# Patient Record
Sex: MALE | Race: OTHER | Employment: FULL TIME | URBAN - METROPOLITAN AREA
[De-identification: names, ages, dates, MRNs, and addresses within clinical notes are randomized per-mention and may not be internally consistent; named-entity substitution may affect disease eponyms.]

---

## 2021-11-18 ENCOUNTER — HOSPITAL ENCOUNTER (INPATIENT)
Age: 26
LOS: 5 days | Discharge: HOME | DRG: 885 | End: 2021-11-24
Attending: EMERGENCY MEDICINE | Admitting: PSYCHIATRY & NEUROLOGY

## 2021-11-18 DIAGNOSIS — F32.3 CURRENT SEVERE EPISODE OF MAJOR DEPRESSIVE DISORDER WITH PSYCHOTIC FEATURES WITHOUT PRIOR EPISODE (HCC): ICD-10-CM

## 2021-11-18 DIAGNOSIS — T14.91XA SUICIDE ATTEMPT (HCC): Primary | ICD-10-CM

## 2021-11-18 DIAGNOSIS — F14.10 COCAINE USE DISORDER (HCC): ICD-10-CM

## 2021-11-18 DIAGNOSIS — T54.92XA INGESTION OF CORROSIVE CHEMICAL, INTENTIONAL SELF-HARM, INITIAL ENCOUNTER (HCC): ICD-10-CM

## 2021-11-18 LAB
ALBUMIN SERPL-MCNC: 4 G/DL (ref 3.5–5)
ALBUMIN/GLOB SERPL: 1.1 {RATIO} (ref 1.1–2.2)
ALP SERPL-CCNC: 88 U/L (ref 45–117)
ALT SERPL-CCNC: 22 U/L (ref 12–78)
AMPHET UR QL SCN: NEGATIVE
ANION GAP SERPL CALC-SCNC: 10 MMOL/L (ref 5–15)
APAP SERPL-MCNC: <2 UG/ML (ref 10–30)
APPEARANCE UR: ABNORMAL
APTT PPP: 23.6 SEC (ref 22.1–31)
AST SERPL-CCNC: 16 U/L (ref 15–37)
BARBITURATES UR QL SCN: NEGATIVE
BASOPHILS # BLD: 0.1 K/UL (ref 0–0.1)
BASOPHILS NFR BLD: 1 % (ref 0–1)
BENZODIAZ UR QL: NEGATIVE
BILIRUB SERPL-MCNC: 0.5 MG/DL (ref 0.2–1)
BILIRUB UR QL CFM: NEGATIVE
BUN SERPL-MCNC: 9 MG/DL (ref 6–20)
BUN/CREAT SERPL: 11 (ref 12–20)
CALCIUM SERPL-MCNC: 9.2 MG/DL (ref 8.5–10.1)
CANNABINOIDS UR QL SCN: POSITIVE
CHLORIDE SERPL-SCNC: 104 MMOL/L (ref 97–108)
CO2 SERPL-SCNC: 26 MMOL/L (ref 21–32)
COCAINE UR QL SCN: POSITIVE
COLOR UR: ABNORMAL
COMMENT, HOLDF: NORMAL
CREAT SERPL-MCNC: 0.84 MG/DL (ref 0.7–1.3)
DIFFERENTIAL METHOD BLD: ABNORMAL
DRUG SCRN COMMENT,DRGCM: ABNORMAL
EOSINOPHIL # BLD: 0 K/UL (ref 0–0.4)
EOSINOPHIL NFR BLD: 0 % (ref 0–7)
ERYTHROCYTE [DISTWIDTH] IN BLOOD BY AUTOMATED COUNT: 13.5 % (ref 11.5–14.5)
ETHANOL SERPL-MCNC: <10 MG/DL
FLUAV RNA SPEC QL NAA+PROBE: NOT DETECTED
FLUBV RNA SPEC QL NAA+PROBE: NOT DETECTED
GLOBULIN SER CALC-MCNC: 3.8 G/DL (ref 2–4)
GLUCOSE SERPL-MCNC: 104 MG/DL (ref 65–100)
GLUCOSE UR STRIP.AUTO-MCNC: NEGATIVE MG/DL
HCT VFR BLD AUTO: 48.3 % (ref 36.6–50.3)
HGB BLD-MCNC: 16.6 G/DL (ref 12.1–17)
HGB UR QL STRIP: NEGATIVE
IMM GRANULOCYTES # BLD AUTO: 0.1 K/UL (ref 0–0.04)
IMM GRANULOCYTES NFR BLD AUTO: 1 % (ref 0–0.5)
INR PPP: 1 (ref 0.9–1.1)
KETONES UR QL STRIP.AUTO: 15 MG/DL
LEUKOCYTE ESTERASE UR QL STRIP.AUTO: NEGATIVE
LIPASE SERPL-CCNC: 79 U/L (ref 73–393)
LYMPHOCYTES # BLD: 1.1 K/UL (ref 0.8–3.5)
LYMPHOCYTES NFR BLD: 11 % (ref 12–49)
MAGNESIUM SERPL-MCNC: 2.2 MG/DL (ref 1.6–2.4)
MCH RBC QN AUTO: 30.5 PG (ref 26–34)
MCHC RBC AUTO-ENTMCNC: 34.4 G/DL (ref 30–36.5)
MCV RBC AUTO: 88.8 FL (ref 80–99)
METHADONE UR QL: NEGATIVE
MONOCYTES # BLD: 0.5 K/UL (ref 0–1)
MONOCYTES NFR BLD: 5 % (ref 5–13)
NEUTS SEG # BLD: 8.6 K/UL (ref 1.8–8)
NEUTS SEG NFR BLD: 82 % (ref 32–75)
NITRITE UR QL STRIP.AUTO: NEGATIVE
NRBC # BLD: 0 K/UL (ref 0–0.01)
NRBC BLD-RTO: 0 PER 100 WBC
OPIATES UR QL: NEGATIVE
PCP UR QL: NEGATIVE
PH UR STRIP: 7 [PH] (ref 5–8)
PLATELET # BLD AUTO: 322 K/UL (ref 150–400)
PMV BLD AUTO: 11.6 FL (ref 8.9–12.9)
POTASSIUM SERPL-SCNC: 3.1 MMOL/L (ref 3.5–5.1)
PROT SERPL-MCNC: 7.8 G/DL (ref 6.4–8.2)
PROT UR STRIP-MCNC: NEGATIVE MG/DL
PROTHROMBIN TIME: 10.4 SEC (ref 9–11.1)
RBC # BLD AUTO: 5.44 M/UL (ref 4.1–5.7)
SALICYLATES SERPL-MCNC: 2.4 MG/DL (ref 2.8–20)
SAMPLES BEING HELD,HOLD: NORMAL
SARS-COV-2, COV2: NOT DETECTED
SODIUM SERPL-SCNC: 140 MMOL/L (ref 136–145)
SP GR UR REFRACTOMETRY: 1.02 (ref 1–1.03)
THERAPEUTIC RANGE,PTTT: NORMAL SECS (ref 58–77)
UROBILINOGEN UR QL STRIP.AUTO: 1 EU/DL (ref 0.2–1)
WBC # BLD AUTO: 10.4 K/UL (ref 4.1–11.1)

## 2021-11-18 PROCEDURE — 80053 COMPREHEN METABOLIC PANEL: CPT

## 2021-11-18 PROCEDURE — 80143 DRUG ASSAY ACETAMINOPHEN: CPT

## 2021-11-18 PROCEDURE — 36415 COLL VENOUS BLD VENIPUNCTURE: CPT

## 2021-11-18 PROCEDURE — 83690 ASSAY OF LIPASE: CPT

## 2021-11-18 PROCEDURE — 85610 PROTHROMBIN TIME: CPT

## 2021-11-18 PROCEDURE — 99285 EMERGENCY DEPT VISIT HI MDM: CPT

## 2021-11-18 PROCEDURE — 74011250636 HC RX REV CODE- 250/636: Performed by: EMERGENCY MEDICINE

## 2021-11-18 PROCEDURE — 93005 ELECTROCARDIOGRAM TRACING: CPT

## 2021-11-18 PROCEDURE — 83735 ASSAY OF MAGNESIUM: CPT

## 2021-11-18 PROCEDURE — 90791 PSYCH DIAGNOSTIC EVALUATION: CPT

## 2021-11-18 PROCEDURE — 80307 DRUG TEST PRSMV CHEM ANLYZR: CPT

## 2021-11-18 PROCEDURE — 80179 DRUG ASSAY SALICYLATE: CPT

## 2021-11-18 PROCEDURE — 85025 COMPLETE CBC W/AUTO DIFF WBC: CPT

## 2021-11-18 PROCEDURE — 82077 ASSAY SPEC XCP UR&BREATH IA: CPT

## 2021-11-18 PROCEDURE — 81003 URINALYSIS AUTO W/O SCOPE: CPT

## 2021-11-18 PROCEDURE — 85730 THROMBOPLASTIN TIME PARTIAL: CPT

## 2021-11-18 PROCEDURE — 87636 SARSCOV2 & INF A&B AMP PRB: CPT

## 2021-11-18 RX ADMIN — SODIUM CHLORIDE 1000 ML: 9 INJECTION, SOLUTION INTRAVENOUS at 22:45

## 2021-11-19 PROBLEM — F43.21 ADJUSTMENT DISORDER WITH DEPRESSED MOOD: Status: RESOLVED | Noted: 2021-11-19 | Resolved: 2021-11-19

## 2021-11-19 PROBLEM — F32.3 CURRENT SEVERE EPISODE OF MAJOR DEPRESSIVE DISORDER WITH PSYCHOTIC FEATURES WITHOUT PRIOR EPISODE (HCC): Status: ACTIVE | Noted: 2021-11-19

## 2021-11-19 PROBLEM — F14.10 COCAINE USE DISORDER (HCC): Status: ACTIVE | Noted: 2021-11-19

## 2021-11-19 PROBLEM — F43.21 ADJUSTMENT DISORDER WITH DEPRESSED MOOD: Status: ACTIVE | Noted: 2021-11-19

## 2021-11-19 LAB
ATRIAL RATE: 132 BPM
CALCULATED P AXIS, ECG09: 63 DEGREES
CALCULATED R AXIS, ECG10: 107 DEGREES
CALCULATED T AXIS, ECG11: 1 DEGREES
DIAGNOSIS, 93000: NORMAL
P-R INTERVAL, ECG05: 142 MS
Q-T INTERVAL, ECG07: 294 MS
QRS DURATION, ECG06: 82 MS
QTC CALCULATION (BEZET), ECG08: 435 MS
VENTRICULAR RATE, ECG03: 132 BPM

## 2021-11-19 PROCEDURE — 74011250637 HC RX REV CODE- 250/637: Performed by: EMERGENCY MEDICINE

## 2021-11-19 PROCEDURE — 99223 1ST HOSP IP/OBS HIGH 75: CPT | Performed by: PSYCHIATRY & NEUROLOGY

## 2021-11-19 PROCEDURE — 65220000003 HC RM SEMIPRIVATE PSYCH

## 2021-11-19 RX ORDER — POTASSIUM CHLORIDE 750 MG/1
40 TABLET, FILM COATED, EXTENDED RELEASE ORAL
Status: COMPLETED | OUTPATIENT
Start: 2021-11-19 | End: 2021-11-19

## 2021-11-19 RX ORDER — BENZTROPINE MESYLATE 1 MG/1
1 TABLET ORAL
Status: DISCONTINUED | OUTPATIENT
Start: 2021-11-19 | End: 2021-11-24 | Stop reason: HOSPADM

## 2021-11-19 RX ORDER — OLANZAPINE 5 MG/1
5 TABLET ORAL
Status: DISCONTINUED | OUTPATIENT
Start: 2021-11-19 | End: 2021-11-24 | Stop reason: HOSPADM

## 2021-11-19 RX ORDER — HYDROXYZINE 25 MG/1
50 TABLET, FILM COATED ORAL
Status: DISCONTINUED | OUTPATIENT
Start: 2021-11-19 | End: 2021-11-24 | Stop reason: HOSPADM

## 2021-11-19 RX ORDER — LORAZEPAM 2 MG/ML
1 INJECTION INTRAMUSCULAR
Status: DISCONTINUED | OUTPATIENT
Start: 2021-11-19 | End: 2021-11-24 | Stop reason: HOSPADM

## 2021-11-19 RX ORDER — ADHESIVE BANDAGE
30 BANDAGE TOPICAL DAILY PRN
Status: DISCONTINUED | OUTPATIENT
Start: 2021-11-19 | End: 2021-11-24 | Stop reason: HOSPADM

## 2021-11-19 RX ORDER — TRAZODONE HYDROCHLORIDE 50 MG/1
50 TABLET ORAL
Status: DISCONTINUED | OUTPATIENT
Start: 2021-11-19 | End: 2021-11-24 | Stop reason: HOSPADM

## 2021-11-19 RX ORDER — DIPHENHYDRAMINE HYDROCHLORIDE 50 MG/ML
50 INJECTION, SOLUTION INTRAMUSCULAR; INTRAVENOUS
Status: DISCONTINUED | OUTPATIENT
Start: 2021-11-19 | End: 2021-11-24 | Stop reason: HOSPADM

## 2021-11-19 RX ORDER — HALOPERIDOL 5 MG/ML
5 INJECTION INTRAMUSCULAR
Status: DISCONTINUED | OUTPATIENT
Start: 2021-11-19 | End: 2021-11-24 | Stop reason: HOSPADM

## 2021-11-19 RX ORDER — FLUOXETINE HYDROCHLORIDE 20 MG/1
20 CAPSULE ORAL DAILY
Status: DISCONTINUED | OUTPATIENT
Start: 2021-11-19 | End: 2021-11-21

## 2021-11-19 RX ORDER — ACETAMINOPHEN 325 MG/1
650 TABLET ORAL
Status: DISCONTINUED | OUTPATIENT
Start: 2021-11-19 | End: 2021-11-24 | Stop reason: HOSPADM

## 2021-11-19 RX ADMIN — POTASSIUM CHLORIDE 40 MEQ: 750 TABLET, EXTENDED RELEASE ORAL at 03:07

## 2021-11-19 NOTE — ED TRIAGE NOTES
Arrives with friends, pt reports he drank harris killer 9pm tonight in order to kill himself. Pt smoked marijuana and crack cocaine and suffers with depression.

## 2021-11-19 NOTE — H&P
INITIAL PSYCHIATRIC EVALUATION            IDENTIFICATION:    Patient Name  Lina Lui   Date of Birth 1995   [facility] [id number]   Medical Record Number  [id number]      Age  32 y.o. PCP None   Admit date:  11/18/2021    Room Number  324/02  @ [facility]   Date of Service  11/19/2021            HISTORY         REASON FOR HOSPITALIZATION:  CC: \"suicide attempt\". Pt admitted under a voluntary basis for suicidal ideations proving to be an imminent danger to self and an inability to care for self. HISTORY OF PRESENT ILLNESS:    The patient, Lina Lui, is a 32 y.o. / male with a past psychiatric history significant for MDD and cocaine and cannabis use disorder, who presents at this time with complaints of (and/or evidence of) the following emotional symptoms: depression, suicidal thoughts/threats and suicide attempt via drinking poison. The above symptoms have been present for 2+ weeks. These symptoms are of moderate to high severity. These symptoms are constant in nature. The patient's condition has been precipitated by psychosocial stressors. Patient's condition made worse by continued illicit drug use. UDS: +cocaine, THC; BAL=0. Per admission documentation, the patient has been having several personal stressors, including a recent separation from his wife and children. 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 The patient denies any prior suicide attempts and states that he has never had any psychiatric problems prior to this. He endorsed AH of a voice telling him to harm himself after having smoked crack cocaine for the second time. The patient is a fair historian and [place]e speaking. The patient corroborates the above narrative. The patient contracts for safety on the unit and gives consent for the team to contact collateral. The patient is amenable to initiating treatment while on the unit. ALLERGIES: No Known Allergies   MEDICATIONS PRIOR TO ADMISSION:   No medications prior to admission. PAST MEDICAL HISTORY:   History reviewed. No pertinent past medical history. History reviewed. No pertinent surgical history. SOCIAL HISTORY:  The patient is currently employed; the patient is a smoker, and smokes up to 1-2 ppd; the patient's marital status is ; the patient has children, aged ; the patient reports the highest level of education achieved is some college. FAMILY HISTORY: History reviewed, pertinent family history as below:   History reviewed. No pertinent family history. REVIEW OF SYSTEMS:   Pertinent items are noted in the History of Present Illness. All other Systems reviewed and are considered negative. MENTAL STATUS EXAM & VITALS     MENTAL STATUS EXAM (MSE):    MSE FINDINGS ARE WITHIN NORMAL LIMITS (WNL) UNLESS OTHERWISE STATED BELOW. ( ALL OF THE BELOW CATEGORIES OF THE MSE HAVE BEEN REVIEWED (reviewed 11/19/2021) AND UPDATED AS DEEMED APPROPRIATE )  General Presentation age appropriate and well groomed, cooperative   Orientation oriented to time, place and person   Vital Signs  See below (reviewed 11/19/2021); Vital Signs (BP, Pulse, & Temp) are within normal limits if not listed below.    Gait and Station Stable/steady, no ataxia   Musculoskeletal System No extrapyramidal symptoms (EPS); no abnormal muscular movements or Tardive Dyskinesia (TD); muscle strength and tone are within normal limits   Language No aphasia or dysarthria   Speech:  normal volume and non-pressured   Thought Processes logical; normal rate of thoughts; fair abstract reasoning/computation   Thought Associations goal directed   Thought Content preoccupations   Suicidal Ideations contracts for safety   Homicidal Ideations none   Mood:  depressed and anhedonia   Affect:  constricted and mood-congruent   Memory recent  intact   Memory remote:  intact   Concentration/Attention:  intact   GOintegro of Knowledge average   Insight:  limited   Reliability good   Judgment:  poor          VITALS:     Patient Vitals for the past 24 hrs:   Temp Pulse Resp BP SpO2   11/19/21 0700  78 15 116/60 100 %   11/19/21 0600  71 14 106/69 99 %   11/19/21 0500  67 14 103/71 100 %   11/19/21 0400  81 18 104/72 100 %   11/19/21 0300  80 19 97/67 100 %   11/19/21 0200  65 16 101/66 100 %   11/19/21 0135  74 17 98/71 100 %   11/19/21 0100  68 15 94/62 98 %   11/19/21 0000  98 15 105/72 99 %   11/18/21 2357  93 17  99 %   11/18/21 2333  (!) 107 20  100 %   11/18/21 2324  (!) 109 19  100 %   11/18/21 2300  (!) 116 19 114/80 100 %   11/18/21 2218 98.1 °F (36.7 °C) (!) 134 22 106/84 100 %     Wt Readings from Last 3 Encounters:   11/18/21 59 kg (130 lb)     Temp Readings from Last 3 Encounters:   11/18/21 98.1 °F (36.7 °C)     BP Readings from Last 3 Encounters:   11/19/21 116/60     Pulse Readings from Last 3 Encounters:   11/19/21 78            DATA     LABORATORY DATA:  Labs Reviewed   CBC WITH AUTOMATED DIFF - Abnormal; Notable for the following components:       Result Value    NEUTROPHILS 82 (*)     LYMPHOCYTES 11 (*)     IMMATURE GRANULOCYTES 1 (*)     ABS. NEUTROPHILS 8.6 (*)     ABS. IMM.  GRANS. 0.1 (*)     All other components within normal limits   METABOLIC PANEL, COMPREHENSIVE - Abnormal; Notable for the following components:    Potassium 3.1 (*)     Glucose 104 (*)     BUN/Creatinine ratio 11 (*)     All other components within normal limits   URINALYSIS W/ RFLX MICROSCOPIC - Abnormal; Notable for the following components:    Appearance TURBID (*)     Ketone 15 (*)     All other components within normal limits   SALICYLATE - Abnormal; Notable for the following components:    Salicylate level 2.4 (*)     All other components within normal limits   ACETAMINOPHEN - Abnormal; Notable for the following components:    Acetaminophen level <2 (*)     All other components within normal limits   DRUG SCREEN, URINE - Abnormal; Notable for the following components:    COCAINE Positive (*)     THC (TH-CANNABINOL) Positive (*)     All other components within normal limits   COVID-19 WITH INFLUENZA A/B   LIPASE   MAGNESIUM   ETHYL ALCOHOL   SAMPLES BEING HELD   PROTHROMBIN TIME + INR   PTT   BILIRUBIN, CONFIRM     Admission on 11/18/2021   Component Date Value Ref Range Status    WBC 11/18/2021 10.4  4.1 - 11.1 K/uL Final    RBC 11/18/2021 5.44  4.10 - 5.70 M/uL Final    HGB 11/18/2021 16.6  12.1 - 17.0 g/dL Final    HCT 11/18/2021 48.3  36.6 - 50.3 % Final    MCV 11/18/2021 88.8  80.0 - 99.0 FL Final    MCH 11/18/2021 30.5  26.0 - 34.0 PG Final    MCHC 11/18/2021 34.4  30.0 - 36.5 g/dL Final    RDW 11/18/2021 13.5  11.5 - 14.5 % Final    PLATELET 90/24/3819 223  150 - 400 K/uL Final    MPV 11/18/2021 11.6  8.9 - 12.9 FL Final    NRBC 11/18/2021 0.0  0  WBC Final    ABSOLUTE NRBC 11/18/2021 0.00  0.00 - 0.01 K/uL Final    NEUTROPHILS 11/18/2021 82* 32 - 75 % Final    LYMPHOCYTES 11/18/2021 11* 12 - 49 % Final    MONOCYTES 11/18/2021 5  5 - 13 % Final    EOSINOPHILS 11/18/2021 0  0 - 7 % Final    BASOPHILS 11/18/2021 1  0 - 1 % Final    IMMATURE GRANULOCYTES 11/18/2021 1* 0.0 - 0.5 % Final    ABS. NEUTROPHILS 11/18/2021 8.6* 1.8 - 8.0 K/UL Final    ABS. LYMPHOCYTES 11/18/2021 1.1  0.8 - 3.5 K/UL Final    ABS. MONOCYTES 11/18/2021 0.5  0.0 - 1.0 K/UL Final    ABS. EOSINOPHILS 11/18/2021 0.0  0.0 - 0.4 K/UL Final    ABS. BASOPHILS 11/18/2021 0.1  0.0 - 0.1 K/UL Final    ABS. IMM. GRANS.  11/18/2021 0.1* 0.00 - 0.04 K/UL Final    DF 11/18/2021 AUTOMATED    Final    Sodium 11/18/2021 140  136 - 145 mmol/L Final    Potassium 11/18/2021 3.1* 3.5 - 5.1 mmol/L Final    Chloride 11/18/2021 104  97 - 108 mmol/L Final    CO2 11/18/2021 26  21 - 32 mmol/L Final    Anion gap 11/18/2021 10  5 - 15 mmol/L Final    Glucose 11/18/2021 104* 65 - 100 mg/dL Final    BUN 11/18/2021 9  6 - 20 MG/DL Final    Creatinine 11/18/2021 0.84  0.70 - 1.30 MG/DL Final    BUN/Creatinine ratio 11/18/2021 11* 12 - 20   Final    GFR est AA 11/18/2021 >60  >60 ml/min/1.73m2 Final    GFR est non-AA 11/18/2021 >60  >60 ml/min/1.73m2 Final    Calcium 11/18/2021 9.2  8.5 - 10.1 MG/DL Final    Bilirubin, total 11/18/2021 0.5  0.2 - 1.0 MG/DL Final    ALT (SGPT) 11/18/2021 22  12 - 78 U/L Final    AST (SGOT) 11/18/2021 16  15 - 37 U/L Final    Alk.  phosphatase 11/18/2021 88  45 - 117 U/L Final    Protein, total 11/18/2021 7.8  6.4 - 8.2 g/dL Final    Albumin 11/18/2021 4.0  3.5 - 5.0 g/dL Final    Globulin 11/18/2021 3.8  2.0 - 4.0 g/dL Final    A-G Ratio 11/18/2021 1.1  1.1 - 2.2   Final    Lipase 11/18/2021 79  73 - 393 U/L Final    Color 11/18/2021 YELLOW/STRAW    Final    Appearance 11/18/2021 TURBID* CLEAR   Final    Specific gravity 11/18/2021 1.025  1.003 - 1.030   Final    pH (UA) 11/18/2021 7.0  5.0 - 8.0   Final    Protein 11/18/2021 Negative  NEG mg/dL Final    Glucose 11/18/2021 Negative  NEG mg/dL Final    Ketone 11/18/2021 15* NEG mg/dL Final    Blood 11/18/2021 Negative  NEG   Final    Urobilinogen 11/18/2021 1.0  0.2 - 1.0 EU/dL Final    Nitrites 11/18/2021 Negative  NEG   Final    Leukocyte Esterase 11/18/2021 Negative  NEG   Final    Magnesium 11/18/2021 2.2  1.6 - 2.4 mg/dL Final    Salicylate level 02/09/1693 2.4* 2.8 - 20.0 MG/DL Final    Acetaminophen level 11/18/2021 <2* 10 - 30 ug/mL Final    AMPHETAMINES 11/18/2021 Negative  NEG   Final    BARBITURATES 11/18/2021 Negative  NEG   Final    BENZODIAZEPINES 11/18/2021 Negative  NEG   Final    COCAINE 11/18/2021 Positive* NEG   Final    METHADONE 11/18/2021 Negative  NEG   Final    OPIATES 11/18/2021 Negative  NEG   Final    PCP(PHENCYCLIDINE) 11/18/2021 Negative  NEG   Final    THC (TH-CANNABINOL) 11/18/2021 Positive* NEG   Final    Drug screen comment 11/18/2021 (NOTE)   Final    ALCOHOL(ETHYL),SERUM 11/18/2021 <10  <10 MG/DL Final  SARS-CoV-2 11/18/2021 Not detected  NOTD   Final    Influenza A by PCR 11/18/2021 Not detected    Final    Influenza B by PCR 11/18/2021 Not detected    Final    SAMPLES BEING HELD 11/18/2021 1BLU,1SST,1PNK   Final    COMMENT 11/18/2021 Add-on orders for these samples will be processed based on acceptable specimen integrity and analyte stability, which may vary by analyte. Final    Ventricular Rate 11/18/2021 132  BPM Final    Atrial Rate 11/18/2021 132  BPM Final    P-R Interval 11/18/2021 142  ms Final    QRS Duration 11/18/2021 82  ms Final    Q-T Interval 11/18/2021 294  ms Final    QTC Calculation (Bezet) 11/18/2021 435  ms Final    Calculated P Axis 11/18/2021 63  degrees Final    Calculated R Axis 11/18/2021 107  degrees Final    Calculated T Axis 11/18/2021 1  degrees Final    Diagnosis 11/18/2021    Final                    Value:Sinus tachycardia      Confirmed by David Dodd M.D., Caridad Dixon ([id number]) on 11/19/2021 7:29:54 AM      INR 11/18/2021 1.0  0.9 - 1.1   Final    Prothrombin time 11/18/2021 10.4  9.0 - 11.1 sec Final    aPTT 11/18/2021 23.6  22.1 - 31.0 sec Final    aPTT, therapeutic range 11/18/2021      58.0 - 77.0 SECS Final    Bilirubin UA, confirm 11/18/2021 Negative  NEG   Final        RADIOLOGY REPORTS:  No results found for this or any previous visit. No results found. MEDICATIONS       ALL MEDICATIONS  No current facility-administered medications for this encounter. SCHEDULED MEDICATIONS  No current facility-administered medications for this encounter. ASSESSMENT & PLAN        The patient, Bubba Thapa, is a 32 y.o.  male who presents at this time for treatment of the following diagnoses:  Patient C/Gaby Roberts 1106 Problem List:   Adjustment disorder with depressed mood (11/19/2021)    Assessment: patient with episode of self harm (injesting poison) in the setting of family stressors.  He has a limited history of using cocaine in the past also in the setting of family stressors. Patient would benefit from sobriety, an antidepressant may be helpful. Plan:   - Observe off substances  - START Prozac 20 mg QDAY for MDD  - IGM therapy as tolerated  - Expand database / obtain collateral  - Dispo planning (home when stable)           A coordinated, multidisplinary treatment team (includes the nurse, unit pharmacist,  and writer) round was conducted for this initial evaluation with the patient present. The following regarding medications was addressed during rounds with patient: the risks and benefits of the proposed medication. The patient was given the opportunity to ask questions. Informed consent given to the use of the above medications. I will continue to adjust psychiatric and non-psychiatric medications (see above \"medication\" section and orders section for details) as deemed appropriate & based upon diagnoses and response to treatment. I have reviewed admission (and previous/old) labs and medical tests in the EHR and or transferring hospital documents. I will continue to order blood tests/labs and diagnostic tests as deemed appropriate and review results as they become available (see orders for details). I have reviewed old psychiatric and medical records available in the EHR. I Will order additional psychiatric records from other institutions to further elucidate the nature of patient's psychopathology and review once available. I will gather additional collateral information from friends, family and o/p treatment team to further elucidate the nature of patient's psychopathology and baselline level of psychiatric functioning.     I certify that this patient's inpatient psychiatric hospital services are required for treatment that could reasonably be expected to improve the patient's condition, or for diagnostic study, and that the patient continues to need, on a daily basis, active treatment furnished directly by or requiring the supervision of inpatient psychiatric facility personnel. In addition, the hospital records show that services furnished were intensive treatment services, admission or related services, or equivalent services.       ESTIMATED LENGTH OF STAY:  5-7 days       STRENGTHS:  Access to housing/residential stability, Awareness of Substance abuse issues and Motivated and ready for change                                        SIGNED:    Lea Pritchard MD  11/19/2021

## 2021-11-19 NOTE — ED NOTES
Pt presents to ED ambulatory complaining of mental health problems x today. Pt reports he drank harris poison 1hr prior to arrivals to the ED. Pt reports he drank the poison intentionally. Pt denies CP, SOB, or any other complaints. Pt reports having esophageal burning sensation Pt reports he has been feeling depressed for weeks. Pt denies HI/AVH. Pt has a hx of depressions but does not take any psych meds. Pt refusing to elaborate more on his situation. Pt has a constant observer at the bedside. Pt placed on cardiac monitoring. Pt reports using marijuana, crack, and cocaine today. Pt is alert and oriented x 4, RR even and unlabored, skin is warm and dry. Assessment completed and pt updated on plan of care. Call bell in reach. Emergency Department Nursing Plan of Care       The Nursing Plan of Care is developed from the Nursing assessment and Emergency Department Attending provider initial evaluation. The plan of care may be reviewed in the ED Provider note.     The Plan of Care was developed with the following considerations:   Patient / Family readiness to learn indicated by:verbalized understanding  Persons(s) to be included in education: patient  Barriers to Learning/Limitations:No    Signed     Maura Flores RN    11/18/2021   10:59 PM

## 2021-11-19 NOTE — PROGRESS NOTES
[facility] Medication Reconciliation     Information obtained from: Patient interview via    RxQuery data available1: No    Comments/recommendations: Denies taking any medications. Medication changes (since last review): None     1RxQuery pharmacy benefit data reflects medications filled and processed through the patient's insurance, however                this data does NOT capture whether the medication was picked up or is currently being taken by the patient. Total Time Spent: 3 minutes    Past Medical History/Disease States:  History reviewed. No pertinent past medical history. Patient allergies:    Allergies as of 11/18/2021    (No Known Allergies)       Prior to admission medications:   None        Thank you,  TAVARES Euceda [facility], [address] [place] [address] [address]: [phone] ([id number]  Pharmacy: [phone]

## 2021-11-19 NOTE — BH NOTES
GROUP THERAPY PROGRESS NOTE    Patient did not participate in Discharge Planning Group.  Patient unable to attend group due to completing intake process    Joyce Sorensen 93

## 2021-11-19 NOTE — GROUP NOTE
ZINA  GROUP DOCUMENTATION INDIVIDUAL                                                                          Group Therapy Note    Date: 11/19/2021    Group Start Time: 1400  Group End Time: 1500  Group Topic: Recreational/Music Therapy    [facility] - [place] [address] ACUTE BEHAV [facility]    Vinay Lorenzana [facility] GROUP    Group Therapy Note    Attendees: 8         Attendance: Did not attend    Patient's Goal:      Interventions/techniques:  Reather Seal

## 2021-11-19 NOTE — ED NOTES
This RN spoke with poison control,     Unknown toxin so they recommend we pull CMP, coags, acetaminophen, salicylate, ethenol, UDS, give fluids, do EKG. Monitor at least 6 hours. Will make MD aware.

## 2021-11-19 NOTE — ED NOTES
Pt ready for transfer to [facility], however they have asked that we wait until shift change to transport pt due to inadequate staffing upstairs and need for 1:1 sitter.

## 2021-11-19 NOTE — ED NOTES
Assumed care of patient. Patient resting in bed comfortably and in no acute distress. Sitter remains at the bedside.

## 2021-11-19 NOTE — GROUP NOTE
ZINA  GROUP DOCUMENTATION INDIVIDUAL                                                                          Group Therapy Note    Date: 11/19/2021    Group Start Time: 1000  Group End Time: 1100  Group Topic: Topic Group    [facility] - [place] [address] ACUTE BEHAV [facility]    Vinay Lorenzana [facility] GROUP    Group Therapy Note    Attendees: 5         Attendance: Did not attend    Patient's Goal:      Interventions/techniques:  Juice Mobley

## 2021-11-19 NOTE — PROGRESS NOTES
Problem: Discharge Planning  Goal: *Discharge to safe environment  Outcome: Not Progressing Towards Goal  Note: Patient does not identify home as a safe environment. Patient to explore other discharge options. Goal: *Knowledge of medication management  Outcome: Progressing Towards Goal  Note: Patient verbalizes understanding of medication regimen. Patient is taking medications as prescribed. Goal: *Knowledge of discharge instructions  Outcome: Progressing Towards Goal  Note: Patient verbalizes understanding of goals for treatment and safe discharge.

## 2021-11-19 NOTE — ED NOTES
Pt's friend will be picking pt up upon discharge. Pt has given permission to update friend on condition/admission.  Phone #: [phone] normal (ped)...

## 2021-11-19 NOTE — PROGRESS NOTES
Behavioral Services  Medicare Certification Upon Admission    I certify that this patient's inpatient psychiatric hospital admission is medically necessary for:    [x] (1) Treatment which could reasonably be expected to improve this patient's condition,       [x] (2) Or for diagnostic study;     AND     [x](2) The inpatient psychiatric services are provided while the individual is under the care of a physician and are included in the individualized plan of care.     Estimated length of stay/service 5-7 days    Plan for post-hospital care home    Electronically signed by Mala Smith MD on 11/19/2021 at 9:00 AM

## 2021-11-19 NOTE — BSMART NOTE
Comprehensive Assessment Form Part 1      Section I - Disposition    Axis I - Adjustment Mood Disorder with depressed mood                Substance Induced Mood Disorder                 Nicotine Dependence   Axis II - Deferred  Axis III - None  Axis IV - Relationship problems, lacks support, difficulty accessing mental health treatment. The Medical Doctor to Psychiatrist conference was not completed. The Medical Doctor is in agreement with Psychiatrist disposition because of (reason) patient is a voluntary admission. The plan is admit patient to behavioral health [address] . The on-call Psychiatrist consulted was MONICA Aly NP  The admitting Psychiatrist will be Dr. Marcial Swartz . The admitting Diagnosis is Adjustment Mood Disorder with depressed mood. The Payor source is . The name of the representative was . This was . Based on the [facility] Suicide Severity Risk Level Scale there is high risk. Based on this assessment the overall risk for suicide is moderate risk . The plan will be admit patient to behavioral health . Section II - Integrated Summary  Summary:  Patient is 32year old Kiswahili speaking male reporting to EDArrives with friends, pt reports he drank harris killer 9pm tonight in order to kill himself. Pt smoked marijuana and crack cocaine and suffers with depression. At bedside, patient reported ingesting harris killer, also use cocaine for the first time today as reported was having suicidal thoughts. Patient reported ingestion was a suicidal attempt at the time but he does not currently have any suicidal thoughts or plans. Patient acknowledged having thoughts in the past but no prior attempts. Patient denied homicidal thoughts and hallucinations. Patient reported increased depression coming from separation from his wife and two children. Patient stated he is currently staying in an hotel. Patient does not have any family support. Patient did not report any other stressors.  Patient reported over past three months he hasnot had an appetite and he has been sleeping about 2-3 hours. Patient is currently working and expressed concern about being admitted due to people at his job knowing where he was. This writer explained that what he is hospitalized from his confidential and they do not have to know. Patient also concerned about paying for admission but acknowledges he needs help. Patient was calm and cooperative. Patient hasnot had any previous admissions in the past. Patient does not have any current or past mental health providers. The patienthas demonstrated mental capacity to provide informed consent. The information is given by the patient. The Chief Complaint is intentional ingestion of harris killer. The Precipitant Factors are relationship problems, lacks support, current substance use. Previous Hospitalizations: no  The patient has not previously been in restraints. Current Psychiatrist and/or  is none. Lethality Assessment:    The potential for suicide noted by the following: current attempt and current substance abuse, no current suicidal thoughts . The potential for homicide is not noted. The patient has not been a perpetrator of sexual or physical abuse. There are not pending charges. The patient is not felt to be at risk for self harm or harm to others. The attending nurse was advised patient contracts for safety in hospital.    Section III - Psychosocial  The patient's overall mood and attitude is low mood, calm and cooperative. Feelings of helplessness and hopelessness are not observed. Generalized anxiety is not observed. Panic is not observed. Phobias are not observed. Obsessive compulsive tendencies are not observed. Section IV - Mental Status Exam  The patient's appearance shows no evidence of impairment. The patient's behavior shows no evidence of impairment. The patient is oriented to time, place, person and situation.   The patient's speech shows no evidence of impairment. The patient's mood is euthymic. The range of affect is flat. The patient's thought content demonstrates no evidence of impairment. The thought process shows no evidence of impairment. The patient's perception shows no evidence of impairment. The patient's memory shows no evidence of impairment. The patient's appetite shows no evidence of impairment. The patient's sleep has evidence of insomnia. The patient's insight shows no evidence of impairment. The patient's judgement shows no evidence of impairment. Section V - Substance Abuse  The patient is using substances. The patient is using tobacco by inhalation for greater than 10 years with last use on yesterday, cannabis by inhalation for 1-5 years with last use on yesterday and cocaine by inhalation for first use yesterday with last use on yesterday. The patient has experienced the following withdrawal symptoms: N/A. Section VI - Living Arrangements  The patient is . The patient lives alone. The patient has 2 children. The patient does plan to return home upon discharge. The patient does not have legal issues pending. The patient's source of income comes from employment. Zoroastrianism and cultural practices have not been voiced at this time. The patient's greatest support comes from no one reported and this person will not be involved with the treatment. The patient has not been in an event described as horrible or outside the realm of ordinary life experience either currently or in the past.  The patient has not been a victim of sexual/physical abuse. Section VII - Other Areas of Clinical Concern  The highest grade achieved is not assessed with the overall quality of school experience being described as not assessed. The patient is currently employed and speaks [place] as a primary language. The patient has no communication impairments affecting communication.  The patient's preference for learning can be described as: can read and write adequately.   The patient's hearing is normal.  The patient's vision is normal.      Susi Maharaj

## 2021-11-19 NOTE — BH NOTES
Admission Note:    Patient admitted to [facility] General unit    Admission Status: Vol    Reason for Admission: SI/depression and tried to OD on cockroach poison. UDS +Cocaine & THC BAL Neg    Pt's Condition on Arrival: Pt is alert/oriented x4. Calm and Cooperative. Appropriate and visible in the milieu. Pt speaks limited english. Mood is depressed. No behavioral issues. Denies suicidal and homicidal ideations. Denies auditory and visual hallucinations. Will continue to monitor pt with q15 min rounds for safety. Primary Nurse Rachid Woodard, RN performed a dual skin assessment on this patient No impairment noted   score is 23    Belongings searched by Carvel Patella secured properly. See orange slip in paper chart and flowsheet in EMR.

## 2021-11-19 NOTE — ED NOTES
Spoke with Nicolas Sheikh, with [employer], regarding pt's medical clearance. This RN informed Nicolas Sheikh that we were advised to hold him until 0300 to monitor any changes in condition.

## 2021-11-19 NOTE — ED NOTES
TRANSFER - OUT REPORT:    Verbal report given to [address] RN(name) on [employer]  being transferred to [address] [place] [address] 324[address]2(unit) for routine progression of care       Report consisted of patients Situation, Background, Assessment and   Recommendations(SBAR). Information from the following report(s) SBAR, ED Summary, [facility] and Recent Results was reviewed with the receiving nurse. Lines:   Peripheral IV 11/18/21 Left Antecubital (Active)   Site Assessment Clean, dry, & intact 11/18/21 2233   Phlebitis Assessment 0 11/18/21 2233   Infiltration Assessment 0 11/18/21 2233   Dressing Status Clean, dry, & intact 11/18/21 2233   Dressing Type Transparent 11/18/21 2233   Hub Color/Line Status Pink; Patent; Flushed 11/18/21 2233   Action Taken Blood drawn 11/18/21 2233        Opportunity for questions and clarification was provided.       Patient transported with:  Abhilash Bassett

## 2021-11-19 NOTE — ED PROVIDER NOTES
78-year-old male presents status post ingestion as a suicide attempt. Patient is Algerian only speaking and history is done through my own Algerian with the help of an . Patient states he has been depressed for 6 months. All day yesterday he felt depressed and then consumed drugs. States he did marijuana, which is typical for him, but also did crack and cocaine which she has never done before. Patient explained to our tech who is fluent in [address] that he feels like there are 2 parts of him and the depressed part took over today. Still feeling suicidal.  About 9 PM tonight he drank \"1/4 cup\" of harris killer. Patient initially had some throat burning and epigastric discomfort but currently is pain-free. Handling his secretions. No stridor. History reviewed. No pertinent past medical history. History reviewed. No pertinent surgical history. History reviewed. No pertinent family history. Social History     Socioeconomic History    Marital status: SINGLE     Spouse name: Not on file    Number of children: Not on file    Years of education: Not on file    Highest education level: Not on file   Occupational History    Not on file   Tobacco Use    Smoking status: Never Smoker    Smokeless tobacco: Never Used   Substance and Sexual Activity    Alcohol use: Yes    Drug use: Yes     Types: Marijuana, Cocaine     Comment: last use 11/18/21    Sexual activity: Not on file   Other Topics Concern    Not on file   Social History Narrative    Not on file     Social Determinants of Health     Financial Resource Strain:     Difficulty of Paying Living Expenses: Not on file   Food Insecurity:     Worried About Running Out of Food in the Last Year: Not on file    Kory of Food in the Last Year: Not on file   Transportation Needs:     Lack of Transportation (Medical): Not on file    Lack of Transportation (Non-Medical):  Not on file   Physical Activity:     Days of Exercise per Week: Not on file    Minutes of Exercise per Session: Not on file   Stress:     Feeling of Stress : Not on file   Social Connections:     Frequency of Communication with Friends and Family: Not on file    Frequency of Social Gatherings with Friends and Family: Not on file    Attends Jainism Services: Not on file    Active Member of [address] [place] [address] or Organizations: Not on file    Attends Club or Organization Meetings: Not on file    Marital Status: Not on file   Intimate Partner Violence:     Fear of Current or Ex-Partner: Not on file    Emotionally Abused: Not on file    Physically Abused: Not on file    Sexually Abused: Not on file   Housing Stability:     Unable to Pay for Housing in the Last Year: Not on file    Number of [place] in the Last Year: Not on file    Unstable Housing in the Last Year: Not on file         ALLERGIES: Patient has no known allergies. Review of Systems   Constitutional: Negative. Negative for fever. HENT: Positive for sore throat. Negative for drooling, facial swelling and trouble swallowing. Eyes: Negative. Negative for discharge and redness. Respiratory: Negative. Negative for chest tightness, shortness of breath and wheezing. Cardiovascular: Negative. Negative for chest pain. Gastrointestinal: Positive for abdominal pain. Negative for abdominal distention, constipation, diarrhea, nausea and vomiting. Endocrine: Negative. Genitourinary: Negative. Negative for difficulty urinating and dysuria. Musculoskeletal: Negative. Negative for arthralgias and myalgias. Skin: Negative. Negative for color change and rash. Allergic/Immunologic: Negative. Neurological: Negative. Negative for syncope, facial asymmetry and speech difficulty. Hematological: Negative. Psychiatric/Behavioral: Negative. Negative for agitation and confusion. All other systems reviewed and are negative.       Vitals:    11/18/21 2218 11/18/21 2300 11/18/21 2324   BP: 106/84 114/80    Pulse: (!) 134 (!) 116 (!) 109   Resp: 22 19 19   Temp: 98.1 °F (36.7 °C)     SpO2: 100% 100% 100%   Weight: 59 kg (130 lb)     Height: 4' 10\" (1.473 m)              Physical Exam  Vitals and nursing note reviewed. Constitutional:       Appearance: Normal appearance. He is well-developed. HENT:      Head: Normocephalic and atraumatic. Eyes:      Conjunctiva/sclera: Conjunctivae normal.   Cardiovascular:      Rate and Rhythm: Normal rate and regular rhythm. Pulmonary:      Effort: No accessory muscle usage or respiratory distress. Abdominal:      Palpations: Abdomen is soft. Tenderness: There is no abdominal tenderness. Musculoskeletal:         General: Normal range of motion. Cervical back: Neck supple. Skin:     General: Skin is warm and dry. Neurological:      Mental Status: He is alert and oriented to person, place, and time. Psychiatric:         Mood and Affect: Mood is depressed. Affect is flat. Behavior: Behavior normal.         Thought Content: Thought content includes suicidal ideation. Thought content includes suicidal plan. MDM  Number of Diagnoses or Management Options  Diagnosis management comments: Per poison control patient will be medically clear after 6 hours observation. ED Course as of 11/19/21 0433   Fri Nov 19, 2021   4539 It has been 6 hours since time of ingestion and patient is without symptoms. Labs relatively unremarkable. (Will dose potassium po) Patient is medically cleared [SS]   [id number] by Jane Martinez from the [place] [address]. Plan is admission [SS]      ED Course User Index  [SS] Bensno Manriquez MD       Procedures    EKG done at 2236: Sinus tach, rate 132, QTc 435, nonspecific ST change, no obvious ischemia. No ectopy.   Interpreted by Francesco French MD         LABORATORY TESTS:  Recent Results (from the past 12 hour(s))   CBC WITH AUTOMATED DIFF    Collection Time: 11/18/21 10:36 PM   Result Value Ref Range    WBC 10.4 4.1 - 11.1 K/uL RBC 5.44 4.10 - 5.70 M/uL    HGB 16.6 12.1 - 17.0 g/dL    HCT 48.3 36.6 - 50.3 %    MCV 88.8 80.0 - 99.0 FL    MCH 30.5 26.0 - 34.0 PG    MCHC 34.4 30.0 - 36.5 g/dL    RDW 13.5 11.5 - 14.5 %    PLATELET 461 815 - 796 K/uL    MPV 11.6 8.9 - 12.9 FL    NRBC 0.0 0  WBC    ABSOLUTE NRBC 0.00 0.00 - 0.01 K/uL    NEUTROPHILS 82 (H) 32 - 75 %    LYMPHOCYTES 11 (L) 12 - 49 %    MONOCYTES 5 5 - 13 %    EOSINOPHILS 0 0 - 7 %    BASOPHILS 1 0 - 1 %    IMMATURE GRANULOCYTES 1 (H) 0.0 - 0.5 %    ABS. NEUTROPHILS 8.6 (H) 1.8 - 8.0 K/UL    ABS. LYMPHOCYTES 1.1 0.8 - 3.5 K/UL    ABS. MONOCYTES 0.5 0.0 - 1.0 K/UL    ABS. EOSINOPHILS 0.0 0.0 - 0.4 K/UL    ABS. BASOPHILS 0.1 0.0 - 0.1 K/UL    ABS. IMM. GRANS. 0.1 (H) 0.00 - 0.04 K/UL    DF AUTOMATED     METABOLIC PANEL, COMPREHENSIVE    Collection Time: 11/18/21 10:36 PM   Result Value Ref Range    Sodium 140 136 - 145 mmol/L    Potassium 3.1 (L) 3.5 - 5.1 mmol/L    Chloride 104 97 - 108 mmol/L    CO2 26 21 - 32 mmol/L    Anion gap 10 5 - 15 mmol/L    Glucose 104 (H) 65 - 100 mg/dL    BUN 9 6 - 20 MG/DL    Creatinine 0.84 0.70 - 1.30 MG/DL    BUN/Creatinine ratio 11 (L) 12 - 20      GFR est AA >60 >60 ml/min/1.73m2    GFR est non-AA >60 >60 ml/min/1.73m2    Calcium 9.2 8.5 - 10.1 MG/DL    Bilirubin, total 0.5 0.2 - 1.0 MG/DL    ALT (SGPT) 22 12 - 78 U/L    AST (SGOT) 16 15 - 37 U/L    Alk.  phosphatase 88 45 - 117 U/L    Protein, total 7.8 6.4 - 8.2 g/dL    Albumin 4.0 3.5 - 5.0 g/dL    Globulin 3.8 2.0 - 4.0 g/dL    A-G Ratio 1.1 1.1 - 2.2     LIPASE    Collection Time: 11/18/21 10:36 PM   Result Value Ref Range    Lipase 79 73 - 393 U/L   URINALYSIS W/ RFLX MICROSCOPIC    Collection Time: 11/18/21 10:36 PM   Result Value Ref Range    Color YELLOW/STRAW      Appearance TURBID (A) CLEAR      Specific gravity 1.025 1.003 - 1.030      pH (UA) 7.0 5.0 - 8.0      Protein Negative NEG mg/dL    Glucose Negative NEG mg/dL    Ketone 15 (A) NEG mg/dL    Blood Negative NEG Urobilinogen 1.0 0.2 - 1.0 EU/dL    Nitrites Negative NEG      Leukocyte Esterase Negative NEG     MAGNESIUM    Collection Time: 11/18/21 10:36 PM   Result Value Ref Range    Magnesium 2.2 1.6 - 2.4 mg/dL   SALICYLATE    Collection Time: 11/18/21 10:36 PM   Result Value Ref Range    Salicylate level 2.4 (L) 2.8 - 20.0 MG/DL   ACETAMINOPHEN    Collection Time: 11/18/21 10:36 PM   Result Value Ref Range    Acetaminophen level <2 (L) 10 - 30 ug/mL   DRUG SCREEN, URINE    Collection Time: 11/18/21 10:36 PM   Result Value Ref Range    AMPHETAMINES Negative NEG      BARBITURATES Negative NEG      BENZODIAZEPINES Negative NEG      COCAINE Positive (A) NEG      METHADONE Negative NEG      OPIATES Negative NEG      PCP(PHENCYCLIDINE) Negative NEG      THC (TH-CANNABINOL) Positive (A) NEG      Drug screen comment (NOTE)    ETHYL ALCOHOL    Collection Time: 11/18/21 10:36 PM   Result Value Ref Range    ALCOHOL(ETHYL),SERUM <10 <10 MG/DL   COVID-19 WITH INFLUENZA A/B    Collection Time: 11/18/21 10:36 PM   Result Value Ref Range    SARS-CoV-2 Not detected NOTD      Influenza A by PCR Not detected      Influenza B by PCR Not detected     SAMPLES BEING HELD    Collection Time: 11/18/21 10:36 PM   Result Value Ref Range    SAMPLES BEING HELD 1BLU,1SST,1PNK     COMMENT        Add-on orders for these samples will be processed based on acceptable specimen integrity and analyte stability, which may vary by analyte.    BILIRUBIN, CONFIRM    Collection Time: 11/18/21 10:36 PM   Result Value Ref Range    Bilirubin UA, confirm Negative NEG     PROTHROMBIN TIME + INR    Collection Time: 11/18/21 11:15 PM   Result Value Ref Range    INR 1.0 0.9 - 1.1      Prothrombin time 10.4 9.0 - 11.1 sec   PTT    Collection Time: 11/18/21 11:15 PM   Result Value Ref Range    aPTT 23.6 22.1 - 31.0 sec    aPTT, therapeutic range     58.0 - 77.0 SECS       IMAGING RESULTS:  No orders to display       MEDICATIONS GIVEN:  Medications   sodium chloride 0.9 % bolus infusion 1,000 mL (0 mL IntraVENous IV Completed 11/19/21 0100)   potassium chloride SR (KLOR-CON 10) tablet 40 mEq (40 mEq Oral Given 11/19/21 0307)       IMPRESSION:  1. Suicide attempt ([facility]    2. Ingestion of corrosive chemical, intentional self-harm, initial encounter ([facility]        PLAN:  1. There are no discharge medications for this patient.     2.   Follow-up Information    None       Return to ED if worse

## 2021-11-19 NOTE — BH NOTES
PSYCHOSOCIAL ASSESSMENT  :Patient identifying info:   Dusty Allen is a 32 y.o., male admitted 11/18/2021 10:15 PM     Presenting problem and precipitating factors: Pt was admitted on a voluntary basis after using drugs - marijuana and crack cocaine pt reported hearing a voice telling him to kill himself and he then drank the harris killer. Pt reports he has been with his partner for many years but she recently told him that she has fallen in love with someone else. Pt reports they have been together for many years and she is in currently in [place]. Pt reports the 8 people he works and live with know what happened and he is embarrassed to return and would like to find a new job and living situation. Mental status assessment:  Anxious, affect is bright, thought process is logical, denied current SI/HI    Strengths: seeking help     Collateral information: None    Current psychiatric /substance abuse providers and contact info: None    Previous psychiatric/substance abuse providers and response to treatment: None    Family history of mental illness or substance abuse: Denied. Substance abuse history:  UDS:+cocaine, THC  BAL=0  Social History     Tobacco Use    Smoking status: Never Smoker    Smokeless tobacco: Never Used   Substance Use Topics    Alcohol use: Yes       History of biomedical complications associated with substance abuse: none reported    Patient's current acceptance of treatment or motivation for change: Fair    Family constellation:   Daughter is 11 and son is 15years old     Is significant other involved?  No    Describe support system: Limited    Describe living arrangements and home environment: Pt lives in a hotel room that his work is paying for     Health issues: [employer] [facility] Problems  Never Reviewed          Codes Class Noted POA    Adjustment disorder with depressed mood ICD-10-CM: F43.21  ICD-9-CM: 309.0  11/19/2021 Unknown              Trauma history: None reported    Legal issues: None reported    History of  service: None    Financial status: Employment     Scientology/cultural factors: None expressed at this time    Education/work history: pt reports attending college for a semester. Pt works for a [employer]. Have you been licensed as a health care professional (current or ): No    Leisure and recreation preferences: Unknown at this time.      Describe coping skills: Chicho Malave  2021

## 2021-11-19 NOTE — ED NOTES
Verbal report given to this RN by Suzanne Bettencourt RN. Pt resting on stretcher in NAD, sitter at bedside.

## 2021-11-19 NOTE — BH NOTES
GROUP THERAPY PROGRESS NOTE    Patient did not participate in Process group.      [facility]

## 2021-11-20 PROCEDURE — 65220000003 HC RM SEMIPRIVATE PSYCH

## 2021-11-20 PROCEDURE — 74011250637 HC RX REV CODE- 250/637: Performed by: PSYCHIATRY & NEUROLOGY

## 2021-11-20 RX ADMIN — FLUOXETINE 20 MG: 20 CAPSULE ORAL at 08:02

## 2021-11-20 RX ADMIN — HYDROXYZINE HYDROCHLORIDE 50 MG: 25 TABLET, FILM COATED ORAL at 10:49

## 2021-11-20 RX ADMIN — OLANZAPINE 5 MG: 5 TABLET, FILM COATED ORAL at 10:49

## 2021-11-20 NOTE — PROGRESS NOTES
Problem: Depressed Mood (Adult/Pediatric)  Goal: *STG: Remains safe in hospital  Outcome: Progressing Towards Goal     Problem: Patient Education: Go to Patient Education Activity  Goal: Patient/Family Education  Outcome: Progressing Towards Goal

## 2021-11-20 NOTE — BH NOTES
Psychiatric Progress Note    Patient: Juan Luis Malik MRN: [id number]  SSN: [id number]    YOB: 1995  Age: 32 y.o. Sex: male      Admit Date: 11/18/2021    LOS: 1 day     Subjective:     Juan Luis Malik  reports feeling down and moods are depressed. Denies SI/HI/AH/VH. No aggression or violence. Appropriately interactive and aware. Tolerating medications well. Eating fairly and sleeping well.     Objective:     Vitals:    11/19/21 0700 11/19/21 0933 11/19/21 2000 11/20/21 0822   BP: 116/60 113/73 116/72 115/73   Pulse: 78 80 76 83   Resp: 15 16 20 18   Temp:  98.4 °F (36.9 °C) 98.2 °F (36.8 °C) 98.4 °F (36.9 °C)   SpO2: 100% 100% 100% 97%   Weight:       Height:            Mental Status Exam:   Sensorium  oriented to time, place and person   Relations cooperative   Eye Contact appropriate   Appearance:  age appropriate   Speech:  non-pressured and soft   Thought Process: goal directed   Thought Content hallucinations   Suicidal ideations none   Mood:  depressed   Affect:  constricted   Memory   adequate   Concentration:  adequate   Insight:  limited   Judgment:  limited       MEDICATIONS:  Current Facility-Administered Medications   Medication Dose Route Frequency    OLANZapine (ZyPREXA) tablet 5 mg  5 mg Oral Q6H PRN    haloperidol lactate (HALDOL) injection 5 mg  5 mg IntraMUSCular Q6H PRN    benztropine (COGENTIN) tablet 1 mg  1 mg Oral BID PRN    diphenhydrAMINE (BENADRYL) injection 50 mg  50 mg IntraMUSCular BID PRN    hydrOXYzine HCL (ATARAX) tablet 50 mg  50 mg Oral TID PRN    LORazepam (ATIVAN) injection 1 mg  1 mg IntraMUSCular Q4H PRN    traZODone (DESYREL) tablet 50 mg  50 mg Oral QHS PRN    acetaminophen (TYLENOL) tablet 650 mg  650 mg Oral Q4H PRN    magnesium hydroxide (MILK OF MAGNESIA) 400 mg/5 mL oral suspension 30 mL  30 mL Oral DAILY PRN    FLUoxetine (PROzac) capsule 20 mg  20 mg Oral DAILY      DISCUSSION:   the risks and benefits of the proposed medication    Lab/Data Review: All lab results for the last 24 hours reviewed. No results found for this or any previous visit (from the past 24 hour(s)). Assessment:     Principal Problem:    Current severe episode of major depressive disorder with psychotic features without prior episode ([facility] (11/19/2021)    Active Problems:    [facility] (11/19/2021)        Plan:     Continue current care  Collateral information  Medication modification as appropriate  Disposition planning with social work    I certify that this patient's inpatient psychiatric hospital services furnished since the previous certification were, and continue to be, required for treatment that could reasonably be expected to improve the patient's condition, or for diagnostic study, and that the patient continues to need, on a daily basis, active treatment furnished directly by or requiring the supervision of inpatient psychiatric facility personnel. In addition, the hospital records show that services furnished were intensive treatment services, admission or related services, or equivalent services.   Signed By: Thalia Thorne MD     November 20, 2021

## 2021-11-20 NOTE — BH NOTES
Morning assessment complete. Pt is alert and oriented x 4. He does confirm both feelings of depression and AH. There are no noted behavioral issues, pt is isolative to his room. There are no other issues to note. Will continue to monitor for changes.

## 2021-11-21 PROCEDURE — 65220000003 HC RM SEMIPRIVATE PSYCH

## 2021-11-21 PROCEDURE — 74011250637 HC RX REV CODE- 250/637: Performed by: PSYCHIATRY & NEUROLOGY

## 2021-11-21 RX ORDER — IBUPROFEN 200 MG
1 TABLET ORAL DAILY
Status: DISCONTINUED | OUTPATIENT
Start: 2021-11-21 | End: 2021-11-22

## 2021-11-21 RX ORDER — FLUOXETINE HYDROCHLORIDE 20 MG/1
40 CAPSULE ORAL DAILY
Status: DISCONTINUED | OUTPATIENT
Start: 2021-11-22 | End: 2021-11-24 | Stop reason: HOSPADM

## 2021-11-21 RX ORDER — IBUPROFEN 200 MG
1 TABLET ORAL DAILY
Status: DISCONTINUED | OUTPATIENT
Start: 2021-11-22 | End: 2021-11-21

## 2021-11-21 RX ADMIN — FLUOXETINE 20 MG: 20 CAPSULE ORAL at 08:49

## 2021-11-21 RX ADMIN — OLANZAPINE 5 MG: 5 TABLET, FILM COATED ORAL at 12:41

## 2021-11-21 RX ADMIN — HYDROXYZINE HYDROCHLORIDE 50 MG: 25 TABLET, FILM COATED ORAL at 12:41

## 2021-11-21 NOTE — BH NOTES
GROUP THERAPY PROGRESS NOTE    Patient did not participate in Recreational Therapy/Coping Skills Group.      VANNESSA Rios

## 2021-11-21 NOTE — PROGRESS NOTES
Assumed care of pt awake in bed. Pt denies si/hi/avh and pain. Pt is isolative and withdrawn. Med and meal compliant. No behavioral issues observed. Mood is depressed with flat affect. Problem: Depressed Mood (Adult/Pediatric)  Goal: *STG: Complies with medication therapy  Outcome: Progressing Towards Goal     Problem: Falls - Risk of  Goal: *Absence of Falls  Description: Document Marcia Fall Risk and appropriate interventions in the flowsheet.   Outcome: Progressing Towards Goal  Note: Fall Risk Interventions:   Medication Interventions: Teach patient to arise slowly

## 2021-11-21 NOTE — BH NOTES
Psychiatric Progress Note    Patient: Kelly Jenkins MRN: [id number]  SSN: [id number]    YOB: 1995  Age: 32 y.o. Sex: male      Admit Date: 11/18/2021    LOS: 2 days     Subjective:     Kelly Jenkins  reports feeling down and moods are depressed. Denies SI/HI/AH/VH. No aggression or violence. Appropriately interactive and aware. Tolerating medications well. Eating fairly and sleeping well.    11/21 - Kelly Jenkins reports hearing the voices loudly in his ears. Reports feeling ok otherwise and moods are good. Denies SI/HI/AH/VH. No aggression or violence. Appropriately interactive and aware. Tolerating medications well. Eating and sleeping fairly.       Objective:     Vitals:    11/19/21 0933 11/19/21 2000 11/20/21 0822 11/20/21 2040   BP: 113/73 116/72 115/73 (!) 95/53   Pulse: 80 76 83 63   Resp: 16 20 18 16   Temp: 98.4 °F (36.9 °C) 98.2 °F (36.8 °C) 98.4 °F (36.9 °C) 97.8 °F (36.6 °C)   SpO2: 100% 100% 97% 99%   Weight:       Height:            Mental Status Exam:   Sensorium  oriented to time, place and person   Relations cooperative   Eye Contact appropriate   Appearance:  age appropriate   Speech:  non-pressured and soft   Thought Process: goal directed   Thought Content hallucinations   Suicidal ideations none   Mood:  depressed   Affect:  constricted   Memory   adequate   Concentration:  adequate   Insight:  limited   Judgment:  limited       MEDICATIONS:  Current Facility-Administered Medications   Medication Dose Route Frequency    OLANZapine (ZyPREXA) tablet 5 mg  5 mg Oral Q6H PRN    haloperidol lactate (HALDOL) injection 5 mg  5 mg IntraMUSCular Q6H PRN    benztropine (COGENTIN) tablet 1 mg  1 mg Oral BID PRN    diphenhydrAMINE (BENADRYL) injection 50 mg  50 mg IntraMUSCular BID PRN    hydrOXYzine HCL (ATARAX) tablet 50 mg  50 mg Oral TID PRN    LORazepam (ATIVAN) injection 1 mg  1 mg IntraMUSCular Q4H PRN    traZODone (DESYREL) tablet 50 mg  50 mg Oral QHS PRN    acetaminophen (TYLENOL) tablet 650 mg  650 mg Oral Q4H PRN    magnesium hydroxide (MILK OF MAGNESIA) 400 mg/5 mL oral suspension 30 mL  30 mL Oral DAILY PRN    FLUoxetine (PROzac) capsule 20 mg  20 mg Oral DAILY      DISCUSSION:   the risks and benefits of the proposed medication    Lab/Data Review: All lab results for the last 24 hours reviewed. No results found for this or any previous visit (from the past 24 hour(s)). Assessment:     Principal Problem:    Current severe episode of major depressive disorder with psychotic features without prior episode ([facility] [address] (11/19/2021)    Active Problems:    Cocaine use disorder ([facility] (11/19/2021)        Plan:     Continue current care  Collateral information  Increase Prozac 40 mg PO Q daily  Disposition planning with social work    I certify that this patient's inpatient psychiatric hospital services furnished since the previous certification were, and continue to be, required for treatment that could reasonably be expected to improve the patient's condition, or for diagnostic study, and that the patient continues to need, on a daily basis, active treatment furnished directly by or requiring the supervision of inpatient psychiatric facility personnel. In addition, the hospital records show that services furnished were intensive treatment services, admission or related services, or equivalent services.   Signed By: Madeleine Little MD     November 21, 2021

## 2021-11-21 NOTE — BH NOTES
GROUP THERAPY PROGRESS NOTE    Patient did not participate in Coping Skills group.     VANNESSA Ornelas

## 2021-11-21 NOTE — BH NOTES
GROUP THERAPY PROGRESS NOTE    Patient did not participate in Coping Skills group.     VANNESSA Rios

## 2021-11-21 NOTE — PROGRESS NOTES
1915: Patient's care assumed with the change of shift report received from the outgoing nurse - 20 Adali Garza RN. Assessed lying in bed, alert and oriented X 4, calm and pleasant, denied SI, HI, pain, he endorse depression, BP- 95/53 fluid intake offered, patient educated on increase fluid intake. No sign of medical distress or behavioral concerns observed. Patient is isolative to room, appears asleep for 9.75 hours, no sign of medical or psych distress observed, monitoring continues.        Problem: Depressed Mood (Adult/Pediatric)  Goal: *STG: Remains safe in hospital  Outcome: Progressing Towards Goal

## 2021-11-22 PROCEDURE — 99232 SBSQ HOSP IP/OBS MODERATE 35: CPT | Performed by: PSYCHIATRY & NEUROLOGY

## 2021-11-22 PROCEDURE — 65220000003 HC RM SEMIPRIVATE PSYCH

## 2021-11-22 PROCEDURE — 74011250637 HC RX REV CODE- 250/637: Performed by: PSYCHIATRY & NEUROLOGY

## 2021-11-22 RX ORDER — IBUPROFEN 200 MG
1 TABLET ORAL DAILY
Status: DISCONTINUED | OUTPATIENT
Start: 2021-11-23 | End: 2021-11-24 | Stop reason: HOSPADM

## 2021-11-22 RX ORDER — ZOLPIDEM TARTRATE 5 MG/1
5 TABLET ORAL
Status: DISCONTINUED | OUTPATIENT
Start: 2021-11-22 | End: 2021-11-24 | Stop reason: HOSPADM

## 2021-11-22 RX ORDER — IBUPROFEN 200 MG
1 TABLET ORAL DAILY
Status: CANCELLED | OUTPATIENT
Start: 2021-11-23

## 2021-11-22 RX ORDER — RISPERIDONE 1 MG/1
1 TABLET, FILM COATED ORAL DAILY
Status: DISCONTINUED | OUTPATIENT
Start: 2021-11-22 | End: 2021-11-24 | Stop reason: HOSPADM

## 2021-11-22 RX ADMIN — ZOLPIDEM TARTRATE 5 MG: 5 TABLET ORAL at 21:11

## 2021-11-22 RX ADMIN — FLUOXETINE 40 MG: 20 CAPSULE ORAL at 08:39

## 2021-11-22 RX ADMIN — RISPERIDONE 1 MG: 1 TABLET ORAL at 14:08

## 2021-11-22 NOTE — PROGRESS NOTES
Laboratory monitoring for mood stabilizer and antipsychotics:    Recommended baseline monitoring has been completed based on this patient's current medication regimen. The patient is currently taking the following medication(s):   Current Facility-Administered Medications   Medication Dose Route Frequency    risperiDONE (RisperDAL) tablet 1 mg  1 mg Oral DAILY    zolpidem (AMBIEN) tablet 5 mg  5 mg Oral QHS    nicotine (NICODERM CQ) 21 mg/24 hr patch 1 Patch  1 Patch TransDERmal DAILY    FLUoxetine (PROzac) capsule 40 mg  40 mg Oral DAILY       Height, Weight, BMI Estimation  Estimated body mass index is 27.17 kg/m² as calculated from the following:    Height as of this encounter: 147.3 cm (58\"). Weight as of this encounter: 59 kg (130 lb). Renal Function, Hepatic Function and Chemistry  Estimated Creatinine Clearance: 94.2 mL/min (by C-G formula based on SCr of 0.84 mg/dL). Lab Results   Component Value Date/Time    Sodium 140 11/18/2021 10:36 PM    Potassium 3.1 (L) 11/18/2021 10:36 PM    Chloride 104 11/18/2021 10:36 PM    CO2 26 11/18/2021 10:36 PM    Anion gap 10 11/18/2021 10:36 PM    Glucose 104 (H) 11/18/2021 10:36 PM    BUN 9 11/18/2021 10:36 PM    Creatinine 0.84 11/18/2021 10:36 PM    BUN/Creatinine ratio 11 (L) 11/18/2021 10:36 PM    GFR est AA >60 11/18/2021 10:36 PM    GFR est non-AA >60 11/18/2021 10:36 PM    Calcium 9.2 11/18/2021 10:36 PM    ALT (SGPT) 22 11/18/2021 10:36 PM    Alk.  phosphatase 88 11/18/2021 10:36 PM    Protein, total 7.8 11/18/2021 10:36 PM    Albumin 4.0 11/18/2021 10:36 PM    Globulin 3.8 11/18/2021 10:36 PM    A-G Ratio 1.1 11/18/2021 10:36 PM    Bilirubin, total 0.5 11/18/2021 10:36 PM       Lab Results   Component Value Date/Time    Glucose 104 (H) 11/18/2021 10:36 PM       Lab Results   Component Value Date/Time    Hemoglobin A1c 4.8 11/23/2021 05:32 AM       Hematology  Lab Results   Component Value Date/Time    WBC 10.4 11/18/2021 10:36 PM    HGB 16.6 11/18/2021 10:36 PM    HCT 48.3 11/18/2021 10:36 PM    PLATELET 383 04/63/6536 10:36 PM    MCV 88.8 11/18/2021 10:36 PM       Lipids  Lab Results   Component Value Date/Time    Cholesterol, total 137 11/23/2021 05:32 AM    HDL Cholesterol 34 11/23/2021 05:32 AM    LDL, calculated 89.8 11/23/2021 05:32 AM    Triglyceride 66 11/23/2021 05:32 AM    CHOL/HDL Ratio 4.0 11/23/2021 05:32 AM       Thyroid Function    Lab Results   Component Value Date/Time    TSH 2.06 11/23/2021 05:32 AM       Visit Vitals  /60   Pulse 84   Temp 98.4 °F (36.9 °C)   Resp 18   Ht 147.3 cm (58\")   Wt 59 kg (130 lb)   SpO2 100%   BMI 27.17 kg/m²       Thank you,  Burgess Chamberlain, Pharm. D., BCPP  [phone]

## 2021-11-22 NOTE — BH NOTES
GROUP THERAPY PROGRESS NOTE    Patient is participating in a Coping Skills group. Group time: 45 minutes     Personal goal for participation: Discuss patients strengths, positive aspects of life and positive affirmations to increase self-esteem and promote healing. Goal orientation: Personal    Group therapy participation: passive    Therapeutic interventions reviewed and discussed: Patients completed Toot Your Own Horn activity sheet. Group discussion focused on patients personal strengths including accomplishments, positive characteristics, and positive choices. Patients discussed positive words others say about them and positive words patients speak over themselves. Group members discussed the benefit and power of positive thinking and self-talk and its role in improving mental health challenges. Group discussed having a positive mind set as a helpful coping skill. Impression of participation: Pt with depressed mood and flat affect. Pt sat in corner of dayroom throughout group conversation, did not participate in conversation or complete worksheet. Pt calm and cooperative.     VANNESSA Pickett

## 2021-11-22 NOTE — BH NOTES
GROUP THERAPY PROGRESS NOTE    Patient is participating in recreational therapy group. Group time: 45 minutes    Personal goal for participation: Work on increasing mental focus on activities outside of racing thoughts and negative self-talk. Goal orientation: Personal    Group therapy participation: minimal    Therapeutic interventions reviewed and discussed: Patients will work on recall and memory with MyNextRun game. Patients worked on patience and cooperating with others to determine the group answer. Impression of participation: William Mendosa was present in group but did not participate verbally due to language difficulties. He did sleect a coloring project and worked on this during group. He gave his project to another group member and was smiling when she thanked him and stated how pretty it was.     Valentina Balbuena LPC LSATP [facility]

## 2021-11-22 NOTE — BH NOTES
GROUP THERAPY PROGRESS NOTE    Patient did not participate in Recreational Therapy/Coping Skills Group. Group time: 60 minutes     Personal goal for participation: To concentrate on selected task; positively engage in interactions with others; actively listen to others; self-disclose thoughts, emotions and behaviors; and discuss coping skills     Goal orientation: Personal    Group therapy participation: minimal    Therapeutic interventions reviewed and discussed: Group members participated in a Stress Bingo recreational therapy activity. Patients were able to actively participate in activity while engaging in conversation with staff and other patients. Patients appropriately socialized and processed presenting problems and coping skills while engaging in activity. Patient processed triggers for stress and ways to reduce stress to better manage mental health. Impression of participation: Pt present in dayroom at beginning of group session, left [facility] through and I did not return. Pt did not interact with others, appeared to minimally play Bingo. Pt with depressed mood and flat affect.     VANNESSA Rios

## 2021-11-22 NOTE — BH NOTES
GROUP THERAPY PROGRESS NOTE    Patient did not participate in Discharge Planning Group.      [facility]icha Moses [facility]

## 2021-11-22 NOTE — BH NOTES
Behavioral Health Treatment Team Note       Patient goal(s) for today: continue taking medication as prescribed, engage in unit activities, participate in hygiene/ADLs, follow directions from staff, contact support team, prepare for discharge  Treatment team focus/goals: medication adjustments, dispo planning  Progress note Pt reports that voices are telling him to stick his head in the toilet and kill himself. Pt is requesting discharge but admits that he does not know if he will hurt himself when he leaves. Pt's mood is anxious, affect is constricted. Pt remains calm and cooperative. Thought process is illogical. Pt agrees to start antipsychotic and medication for sleep. LOS:  3  Expected LOS: TBD    Financial concerns/prescription coverage: Referred to [facility]   Date of last family contact: None     Family requesting physician contact today:No  Discharge plan: Home to hotel  Guns in the home: None reported       Outpatient provider(s): To be linked.      Participating treatment team members: Virgen Gamino, VANNESSA Valdez and Dr. Kingston Vargas

## 2021-11-22 NOTE — BH NOTES
GROUP THERAPY PROGRESS NOTE    Patient did not participate in Coping Skills group.      Malcolm Monteiro LPC LSATP [facility]

## 2021-11-22 NOTE — PROGRESS NOTES
Patient's care assumed with the change of shift report received from the outgoing nurse - [address], RN. Assessed lying in bed, alert and oriented X 4, calm and pleasant, observe to be anxious, denied SI, HI, AVH pain, he endorse depression  No sign of medical distress or behavioral concerns observed. Patient in the day room watching TV. Patient observed to appear asleep for 8.5 hours. Problem: Falls - Risk of  Goal: *Absence of Falls  Description: Document Genie Osborne Fall Risk and appropriate interventions in the flowsheet.   Outcome: Progressing Towards Goal  Note: Fall Risk Interventions:            Medication Interventions: Teach patient to arise slowly

## 2021-11-23 LAB
CHOLEST SERPL-MCNC: 137 MG/DL
EST. AVERAGE GLUCOSE BLD GHB EST-MCNC: 91 MG/DL
HBA1C MFR BLD: 4.8 % (ref 4–5.6)
HDLC SERPL-MCNC: 34 MG/DL
HDLC SERPL: 4 {RATIO} (ref 0–5)
LDLC SERPL CALC-MCNC: 89.8 MG/DL (ref 0–100)
TRIGL SERPL-MCNC: 66 MG/DL (ref ?–150)
TSH SERPL DL<=0.05 MIU/L-ACNC: 2.06 UIU/ML (ref 0.36–3.74)
VLDLC SERPL CALC-MCNC: 13.2 MG/DL

## 2021-11-23 PROCEDURE — 74011250637 HC RX REV CODE- 250/637: Performed by: PSYCHIATRY & NEUROLOGY

## 2021-11-23 PROCEDURE — 99231 SBSQ HOSP IP/OBS SF/LOW 25: CPT | Performed by: PSYCHIATRY & NEUROLOGY

## 2021-11-23 PROCEDURE — 84443 ASSAY THYROID STIM HORMONE: CPT

## 2021-11-23 PROCEDURE — 36415 COLL VENOUS BLD VENIPUNCTURE: CPT

## 2021-11-23 PROCEDURE — 65220000003 HC RM SEMIPRIVATE PSYCH

## 2021-11-23 PROCEDURE — 83036 HEMOGLOBIN GLYCOSYLATED A1C: CPT

## 2021-11-23 PROCEDURE — 80061 LIPID PANEL: CPT

## 2021-11-23 RX ORDER — ZOLPIDEM TARTRATE 5 MG/1
TABLET ORAL
Qty: 30 TABLET | Refills: 0 | Status: SHIPPED | OUTPATIENT
Start: 2021-11-23

## 2021-11-23 RX ORDER — RISPERIDONE 1 MG/1
TABLET, FILM COATED ORAL
Qty: 30 TABLET | Refills: 5 | Status: SHIPPED | OUTPATIENT
Start: 2021-11-23

## 2021-11-23 RX ORDER — FLUOXETINE HYDROCHLORIDE 40 MG/1
CAPSULE ORAL
Qty: 30 CAPSULE | Refills: 5 | Status: SHIPPED | OUTPATIENT
Start: 2021-11-23 | End: 2021-11-23 | Stop reason: SDUPTHER

## 2021-11-23 RX ORDER — FLUOXETINE HYDROCHLORIDE 40 MG/1
CAPSULE ORAL
Qty: 30 CAPSULE | Refills: 5 | Status: SHIPPED | OUTPATIENT
Start: 2021-11-23

## 2021-11-23 RX ADMIN — ZOLPIDEM TARTRATE 5 MG: 5 TABLET ORAL at 21:39

## 2021-11-23 RX ADMIN — FLUOXETINE 40 MG: 20 CAPSULE ORAL at 09:24

## 2021-11-23 RX ADMIN — RISPERIDONE 1 MG: 1 TABLET ORAL at 09:24

## 2021-11-23 NOTE — BH NOTES
GROUP THERAPY PROGRESS NOTE    Patient is participating in recreational therapy group. Group time: 45 minutes    Personal goal for participation: Work on increasing mental focus on activities outside of racing thoughts and negative self-talk. Goal orientation: Personal    Group therapy participation: minimal    Therapeutic interventions reviewed and discussed: Patients will work on their choice of velvet art to work on focus and concentration. Group members will also work on collaboration and sharing of art supplies. Impression of participation: Catrachita Kim was present at the second half of group. He came in and agreed to sit and color quietly with a small group working on art work. He was quiet and colored and did not interact with others.     Malcolm Monteiro LPC LSATP [facility]

## 2021-11-23 NOTE — DISCHARGE SUMMARY
PSYCHIATRIC DISCHARGE SUMMARY         IDENTIFICATION:    Patient Name  Panda Lopez   Date of Birth 1995   [facility] [id number]   Medical Record Number  [id number]      Age  32 y.o. 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 ([facility] [facility] ICD-10-CM: F32.3  ICD-9-CM: 296.24         Cocaine use disorder ([facility] [facility] ICD-10-CM: F14.10  ICD-9-CM: 305.60               Active Hospital Problems    *Current severe episode of major depressive disorder with psychotic features without prior episode ([facility] [facility]      Cocaine use disorder ([facility] [address] [facility]        DISCHARGE DIAGNOSIS:   Axis I:  SEE ABOVE  Axis II: SEE ABOVE  Axis III: SEE ABOVE  Axis IV:  lack of structure  Axis V:  30 on admission, 80 on discharge     CC & HISTORY OF PRESENT ILLNESS:  \"suicidal ideation\"    The patient, Panda Lopez, is a 32 y.o. / male with a past psychiatric history significant for MDD and cocaine and cannabis use disorder, who presents at this time with complaints of (and/or evidence of) the following emotional symptoms: depression, suicidal thoughts/threats and suicide attempt via drinking poison. The above symptoms have been present for 2+ weeks. These symptoms are of moderate to high severity. These symptoms are constant in nature. The patient's condition has been precipitated by psychosocial stressors. Patient's condition made worse by continued illicit drug use.  UDS: +cocaine, THC; BAL=0.      Per admission documentation, the patient has been having several personal stressors, including a recent separation from his wife and children. 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 The patient denies any prior suicide attempts and states that he has never had any psychiatric problems prior to this. He endorsed AH of a voice telling him to harm himself after having smoked crack cocaine for the second time.     The patient is a fair historian and Qatari speaking. The patient corroborates the above narrative. The patient contracts for safety on the unit and gives consent for the team to contact collateral. The patient is amenable to initiating treatment while on the unit. SOCIAL HISTORY:    Social History     Socioeconomic History    Marital status: SINGLE     Spouse name: Not on file    Number of children: Not on file    Years of education: Not on file    Highest education level: Not on file   Occupational History    Not on file   Tobacco Use    Smoking status: Never Smoker    Smokeless tobacco: Never Used   Substance and Sexual Activity    Alcohol use: Yes    Drug use: Yes     Types: Marijuana, Cocaine     Comment: last use 11/18/21    Sexual activity: Not on file   Other Topics Concern    Not on file   Social History Narrative    Not on file     Social Determinants of Health     Financial Resource Strain:     Difficulty of Paying Living Expenses: Not on file   Food Insecurity:     Worried About Running Out of Food in the Last Year: Not on file    Kory of Food in the Last Year: Not on file   Transportation Needs:     Lack of Transportation (Medical): Not on file    Lack of Transportation (Non-Medical):  Not on file   Physical Activity:     Days of Exercise per Week: Not on file    Minutes of Exercise per Session: Not on file   Stress:     Feeling of Stress : Not on file   Social Connections:     Frequency of Communication with Friends and Family: Not on file    Frequency of Social Gatherings with Friends and Family: Not on file    Attends Christianity Services: Not on file  Active Member of Clubs or Organizations: Not on file    Attends Club or Organization Meetings: Not on file    Marital Status: Not on file   Intimate Partner Violence:     Fear of Current or Ex-Partner: Not on file    Emotionally Abused: Not on file    Physically Abused: Not on file    Sexually Abused: Not on file   Housing Stability:     Unable to Pay for Housing in the Last Year: Not on file    Number of [place] in the Last Year: Not on file    Unstable Housing in the Last Year: Not on file      FAMILY HISTORY:   History reviewed. No pertinent family history. HOSPITALIZATION COURSE:    Giovanni Whittington was admitted to the inpatient psychiatric unit [facility] for acute psychiatric stabilization in regards to symptomatology as described in the HPI above. The differential diagnosis at time of admission include: MDD vs adjustment disorder. While on the unit Giovanni Whittington was involved in individual, group, occupational and milieu therapy. Psychiatric medications were adjusted during this hospitalization including Prozac and Risperdal.   Giovanni Whittington demonstrated a slow, but progressive improvement in overall condition. Much of patient's initial presentation appeared to be related to situational stressors, effects of drugs of abuse, and psychological factors. Please see individual progress notes for more specific details regarding patient's hospitalization course. At time of discharge, Giovanni Whittington is without significant problems of depression, psychosis, or meg. Patient free of suicidal and homicidal ideations (appears to be at very low risk of suicide or homicide) and reports many positive predictive factors in terms of not attempting suicide or homicide. Overall presentation at time of discharge is most consistent with the diagnosis of major depressive disorder and cocaine use disorder.     Patient has maximized benefit to be derived from acute inpatient psychiatric treatment. All members of the treatment team concur with each other in regards to plans for discharge today. Patient and family are aware and in agreement with discharge and discharge plan. LABS AND IMAGAING:    Labs Reviewed   CBC WITH AUTOMATED DIFF - Abnormal; Notable for the following components:       Result Value    NEUTROPHILS 82 (*)     LYMPHOCYTES 11 (*)     IMMATURE GRANULOCYTES 1 (*)     ABS. NEUTROPHILS 8.6 (*)     ABS. IMM.  GRANS. 0.1 (*)     All other components within normal limits   METABOLIC PANEL, COMPREHENSIVE - Abnormal; Notable for the following components:    Potassium 3.1 (*)     Glucose 104 (*)     BUN/Creatinine ratio 11 (*)     All other components within normal limits   URINALYSIS W/ RFLX MICROSCOPIC - Abnormal; Notable for the following components:    Appearance TURBID (*)     Ketone 15 (*)     All other components within normal limits   SALICYLATE - Abnormal; Notable for the following components:    Salicylate level 2.4 (*)     All other components within normal limits   ACETAMINOPHEN - Abnormal; Notable for the following components:    Acetaminophen level <2 (*)     All other components within normal limits   DRUG SCREEN, URINE - Abnormal; Notable for the following components:    COCAINE Positive (*)     THC (TH-CANNABINOL) Positive (*)     All other components within normal limits   COVID-19 WITH INFLUENZA A/B   LIPASE   MAGNESIUM   ETHYL ALCOHOL   SAMPLES BEING HELD   PROTHROMBIN TIME + INR   PTT   BILIRUBIN, CONFIRM   TSH 3RD GENERATION   LIPID PANEL   HEMOGLOBIN A1C WITH EAG     No results found for: DS35, PHEN, PHENO, PHENT, DILF, DS39, PHENY, PTN, VALF2, VALAC, VALP, VALPR, DS6, CRBAM, CRBAMP, CARB2, XCRBAM  Admission on 11/18/2021   Component Date Value Ref Range Status    WBC 11/18/2021 10.4  4.1 - 11.1 K/uL Final    RBC 11/18/2021 5.44  4.10 - 5.70 M/uL Final    HGB 11/18/2021 16.6  12.1 - 17.0 g/dL Final    HCT 11/18/2021 48.3  36.6 - 50.3 % Final    MCV 11/18/2021 88.8  80.0 - 99.0 FL Final    MCH 11/18/2021 30.5  26.0 - 34.0 PG Final    MCHC 11/18/2021 34.4  30.0 - 36.5 g/dL Final    RDW 11/18/2021 13.5  11.5 - 14.5 % Final    PLATELET 69/43/9631 349  150 - 400 K/uL Final    MPV 11/18/2021 11.6  8.9 - 12.9 FL Final    NRBC 11/18/2021 0.0  0  WBC Final    ABSOLUTE NRBC 11/18/2021 0.00  0.00 - 0.01 K/uL Final    NEUTROPHILS 11/18/2021 82* 32 - 75 % Final    LYMPHOCYTES 11/18/2021 11* 12 - 49 % Final    MONOCYTES 11/18/2021 5  5 - 13 % Final    EOSINOPHILS 11/18/2021 0  0 - 7 % Final    BASOPHILS 11/18/2021 1  0 - 1 % Final    IMMATURE GRANULOCYTES 11/18/2021 1* 0.0 - 0.5 % Final    ABS. NEUTROPHILS 11/18/2021 8.6* 1.8 - 8.0 K/UL Final    ABS. LYMPHOCYTES 11/18/2021 1.1  0.8 - 3.5 K/UL Final    ABS. MONOCYTES 11/18/2021 0.5  0.0 - 1.0 K/UL Final    ABS. EOSINOPHILS 11/18/2021 0.0  0.0 - 0.4 K/UL Final    ABS. BASOPHILS 11/18/2021 0.1  0.0 - 0.1 K/UL Final    ABS. IMM. GRANS. 11/18/2021 0.1* 0.00 - 0.04 K/UL Final    DF 11/18/2021 AUTOMATED    Final    Sodium 11/18/2021 140  136 - 145 mmol/L Final    Potassium 11/18/2021 3.1* 3.5 - 5.1 mmol/L Final    Chloride 11/18/2021 104  97 - 108 mmol/L Final    CO2 11/18/2021 26  21 - 32 mmol/L Final    Anion gap 11/18/2021 10  5 - 15 mmol/L Final    Glucose 11/18/2021 104* 65 - 100 mg/dL Final    BUN 11/18/2021 9  6 - 20 MG/DL Final    Creatinine 11/18/2021 0.84  0.70 - 1.30 MG/DL Final    BUN/Creatinine ratio 11/18/2021 11* 12 - 20   Final    GFR est AA 11/18/2021 >60  >60 ml/min/1.73m2 Final    GFR est non-AA 11/18/2021 >60  >60 ml/min/1.73m2 Final    Calcium 11/18/2021 9.2  8.5 - 10.1 MG/DL Final    Bilirubin, total 11/18/2021 0.5  0.2 - 1.0 MG/DL Final    ALT (SGPT) 11/18/2021 22  12 - 78 U/L Final    AST (SGOT) 11/18/2021 16  15 - 37 U/L Final    Alk.  phosphatase 11/18/2021 88  45 - 117 U/L Final    Protein, total 11/18/2021 7.8  6.4 - 8.2 g/dL Final    Albumin 11/18/2021 4.0  3.5 - 5.0 g/dL Final    Globulin 11/18/2021 3.8  2.0 - 4.0 g/dL Final    A-G Ratio 11/18/2021 1.1  1.1 - 2.2   Final    Lipase 11/18/2021 79  73 - 393 U/L Final    Color 11/18/2021 YELLOW/STRAW    Final    Appearance 11/18/2021 TURBID* CLEAR   Final    Specific gravity 11/18/2021 1.025  1.003 - 1.030   Final    pH (UA) 11/18/2021 7.0  5.0 - 8.0   Final    Protein 11/18/2021 Negative  NEG mg/dL Final    Glucose 11/18/2021 Negative  NEG mg/dL Final    Ketone 11/18/2021 15* NEG mg/dL Final    Blood 11/18/2021 Negative  NEG   Final    Urobilinogen 11/18/2021 1.0  0.2 - 1.0 EU/dL Final    Nitrites 11/18/2021 Negative  NEG   Final    Leukocyte Esterase 11/18/2021 Negative  NEG   Final    Magnesium 11/18/2021 2.2  1.6 - 2.4 mg/dL Final    Salicylate level 19/26/0131 2.4* 2.8 - 20.0 MG/DL Final    Acetaminophen level 11/18/2021 <2* 10 - 30 ug/mL Final    AMPHETAMINES 11/18/2021 Negative  NEG   Final    BARBITURATES 11/18/2021 Negative  NEG   Final    BENZODIAZEPINES 11/18/2021 Negative  NEG   Final    COCAINE 11/18/2021 Positive* NEG   Final    METHADONE 11/18/2021 Negative  NEG   Final    OPIATES 11/18/2021 Negative  NEG   Final    PCP(PHENCYCLIDINE) 11/18/2021 Negative  NEG   Final    THC (TH-CANNABINOL) 11/18/2021 Positive* NEG   Final    Drug screen comment 11/18/2021 (NOTE)   Final    ALCOHOL(ETHYL),SERUM 11/18/2021 <10  <10 MG/DL Final    SARS-CoV-2 11/18/2021 Not detected  NOTD   Final    Influenza A by PCR 11/18/2021 Not detected    Final    Influenza B by PCR 11/18/2021 Not detected    Final    SAMPLES BEING HELD 11/18/2021 1BLU,1SST,1PNK   Final    COMMENT 11/18/2021 Add-on orders for these samples will be processed based on acceptable specimen integrity and analyte stability, which may vary by analyte.     Final    Ventricular Rate 11/18/2021 132  BPM Final    Atrial Rate 11/18/2021 132  BPM Final    P-R Interval 11/18/2021 142  ms Final    QRS Duration 11/18/2021 82  ms Final    Q-T Interval 11/18/2021 294  ms Final    QTC Calculation (Bezet) 11/18/2021 435  ms Final    Calculated P Axis 11/18/2021 63  degrees Final    Calculated R Axis 11/18/2021 107  degrees Final    Calculated T Axis 11/18/2021 1  degrees Final    Diagnosis 11/18/2021    Final                    Value:Sinus tachycardia      Confirmed by Arturo Crisostomo M.D., Cruz Younger ([id number]) on 11/19/2021 7:29:54 AM      INR 11/18/2021 1.0  0.9 - 1.1   Final    Prothrombin time 11/18/2021 10.4  9.0 - 11.1 sec Final    aPTT 11/18/2021 23.6  22.1 - 31.0 sec Final    aPTT, therapeutic range 11/18/2021      58.0 - 77.0 SECS Final    Bilirubin UA, confirm 11/18/2021 Negative  NEG   Final    TSH 11/23/2021 2.06  0.36 - 3.74 uIU/mL Final    Cholesterol, total 11/23/2021 137  <200 MG/DL Final    Triglyceride 11/23/2021 66  <150 MG/DL Final    HDL Cholesterol 11/23/2021 34  MG/DL Final    LDL, calculated 11/23/2021 89.8  0 - 100 MG/DL Final    VLDL, calculated 11/23/2021 13.2  MG/DL Final    CHOL/HDL Ratio 11/23/2021 4.0  0.0 - 5.0   Final    Hemoglobin A1c 11/23/2021 4.8  4.0 - 5.6 % Final    Est. average glucose 11/23/2021 91  mg/dL Final     No results found. DISPOSITION:    Home. Patient to f/u with psychiatric appointments. Patient is to f/u with internist as directed. FOLLOW-UP CARE:    Activity as tolerated  Regular diet  Wound Care: none needed. Follow-up Information     Follow up With Specialties Details Why [id number] [address]  Call This organization offers primary care assistance for uninsured individuals then offers services such as counseling for those meeting criteria. HOTLINE: [phone]  *Please call hotline number to plan for hospital discharge appointment into the clinic. *  Across from [facility]; enter at the back of the building.   Phone: [phone]      The Free Clinics   Please call to find a free clinic in your area of [address] ---- based on nature of patient's pathology/ies and treatment compliance issues. Prognosis is greatly dependent upon patient's ability to remain sober and to follow up with scheduled appointments as well as to comply with psychiatric medications as prescribed. 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. This team consists of the nurse, psychiatric unit pharmacist,  and writer. I have spent greater than 35 minutes on discharge work.     Signed:  Britt Thomas MD  11/23/2021

## 2021-11-23 NOTE — PROGRESS NOTES
Patient observed in hallway during transition of care. Patient rounds completed to ensure safety. Patient displayed no obvious signs of medical or psychiatric distress. Patient supported in completing shift assessment by use of interpretor. Interpretor Osmani Livingston ([id number]). Patient stated that he was feeling, \"good today\". Patient denied any medical concerns or symptoms of pain. Patient denied any symptoms depression. Patient reported symptoms of anxiety related to his desire smoke a cigarette. Patient reported additional symptoms of anxiety related to his desire to d/c on 11/23/21. Patient stated that he is not from here and therefore has no relatives or support systems. Patient stated that his friends whom he has been living with will be leaving to go to [place] on 11/23. Patient stated that he desires to leave with them on tomorrow. Patient reported no hallucinations of any type. Patient reported no thoughts of self-harm or harm to others. Patient received prescribed HS medications with no concerns. Patient went to bed around 9:00pm and slept throughout the night with no concerns. Patient completed ordered lab work with no concerns. Staff will continue to assess and monitor.             Problem: Depressed Mood (Adult/Pediatric)  Goal: *STG: Participates in treatment plan  Outcome: Progressing Towards Goal  Goal: *STG: Remains safe in hospital  Outcome: Progressing Towards Goal  Goal: *STG: Complies with medication therapy  Outcome: Progressing Towards Goal

## 2021-11-23 NOTE — SUICIDE SAFETY PLAN
SAFETY PLAN    A suicide Safety Plan is a document that supports someone when they are having thoughts of suicide. Warning Signs that indicate a suicidal crisis may be developing: What (situations, thoughts, feelings, body sensations, behaviors, etc.) do you experience that lets you know you are beginning to think about suicide? 1. Feelings of desperation   2. Fear  3. Feelings of regret    Internal Coping Strategies:  What things can I do (relaxation techniques, hobbies, physical activities, etc.) to take my mind off my problems without contacting another person? 1. Playing the trumpet  2. Reading the Bible  3. Watching anime     People and social settings that provide distraction: Who can I call or where can I go to distract me? 1. Name: Mother  3. Place: [employer]          4. Place: [employer] whom I can ask for help: Who can I call when I need help - for example, friends, family, clergy, someone else? 1. Name: Pauly Wells  [phone]  2. Name: Mother    Professionals or [address] [place] [address] I can contact during a crisis: Who can I call for help - for example, my doctor, my psychiatrist, my psychologist, a mental health provider, a suicide hotline? 1. Clinician Name: [facility]   Address: [address] [place] [address]  Phone: [phone]  Fax: [phone]  Crisis number:  [phone]     6. Suicide Prevention Lifeline: 5-248-334-TALK (9903)    4. [address] [place] [address] Emergency Services -  for example, [address] [place] [address] suicide hotline, [facility] Hotline: [facility]   Address: [address] [place] [address], [address] [place] [address]  Phone: [phone]  Fax: [phone]  Crisis number:  [phone]     Making the environment safe: How can I make my environment (house/apartment/living space) safer? For example, can I remove guns, medications, and other items? 1. Take medications as prescribed.

## 2021-11-23 NOTE — PROGRESS NOTES
Problem: Depressed Mood (Adult/Pediatric)  Goal: *STG: Participates in treatment plan  Outcome: Progressing Towards Goal  Goal: *STG: Remains safe in hospital  Outcome: Progressing Towards Goal  Goal: *STG: Complies with medication therapy  Outcome: Progressing Towards Goal     0700: Shift change report given to Charley KENT (oncoming nurse) by Dena Wilson (offgoing nurse). Report included the following information SBAR, Kardex, MAR and Recent Results. [id number]: Assumed care of patient. Met patient in room.  used to communicate with patient. Patient denied anxiety, depression, SI, HI, AVH and pain. Patient concerned about discharge. MD notified. Medication and meal compliant. [id number]: Patient in dayroom participating in groups, interacting appropriately. [id number]: Meal compliant. Patient up in room resting quietly. [id number]: Patient in bed sleeping. No visible signs of distress noted. 3915-3633: used to review discharge instructions with patient. Patient verbalized understanding. Writer received phone call from one of patients coworkers. The coworker informed writer that the patient has been lying to staff and that he is not ready for discharge. They also stated that they live in [place] and are not able to come get the patient. Patient called coworker named 'Lou Magana'. Sergey's friend spoke to staff stating that they will come pick the patient up later tonight. Patient made aware. Night shift notified.

## 2021-11-23 NOTE — BH NOTES
Behavioral Health Transition Record to Provider    Patient Name: Do Purvis  YOB: 1995  Medical Record Number: [id number]  Date of Admission: 11/18/2021  Date of Discharge: 11/24/2021    Attending Provider: Fabienne Rivas, *  Discharging Provider: Roni Fulton MD  To contact this individual call [phone] and ask the  to page. If unavailable, ask to be transferred to [facility] Provider on call. [facility] Provider will be available on call 24/7 and during holidays. Primary Care Provider: None    No Known Allergies    Reason for Admission: psychosis     Admission Diagnosis: Adjustment disorder with depressed mood [F43.21]    * No surgery found *    Results for orders placed or performed during the hospital encounter of 11/18/21   COVID-19 WITH INFLUENZA A/B   Result Value Ref Range    SARS-CoV-2 Not detected NOTD      Influenza A by PCR Not detected      Influenza B by PCR Not detected     CBC WITH AUTOMATED DIFF   Result Value Ref Range    WBC 10.4 4.1 - 11.1 K/uL    RBC 5.44 4.10 - 5.70 M/uL    HGB 16.6 12.1 - 17.0 g/dL    HCT 48.3 36.6 - 50.3 %    MCV 88.8 80.0 - 99.0 FL    MCH 30.5 26.0 - 34.0 PG    MCHC 34.4 30.0 - 36.5 g/dL    RDW 13.5 11.5 - 14.5 %    PLATELET 260 773 - 495 K/uL    MPV 11.6 8.9 - 12.9 FL    NRBC 0.0 0  WBC    ABSOLUTE NRBC 0.00 0.00 - 0.01 K/uL    NEUTROPHILS 82 (H) 32 - 75 %    LYMPHOCYTES 11 (L) 12 - 49 %    MONOCYTES 5 5 - 13 %    EOSINOPHILS 0 0 - 7 %    BASOPHILS 1 0 - 1 %    IMMATURE GRANULOCYTES 1 (H) 0.0 - 0.5 %    ABS. NEUTROPHILS 8.6 (H) 1.8 - 8.0 K/UL    ABS. LYMPHOCYTES 1.1 0.8 - 3.5 K/UL    ABS. MONOCYTES 0.5 0.0 - 1.0 K/UL    ABS. EOSINOPHILS 0.0 0.0 - 0.4 K/UL    ABS. BASOPHILS 0.1 0.0 - 0.1 K/UL    ABS. IMM.  GRANS. 0.1 (H) 0.00 - 0.04 K/UL    DF AUTOMATED     METABOLIC PANEL, COMPREHENSIVE   Result Value Ref Range    Sodium 140 136 - 145 mmol/L    Potassium 3.1 (L) 3.5 - 5.1 mmol/L Chloride 104 97 - 108 mmol/L    CO2 26 21 - 32 mmol/L    Anion gap 10 5 - 15 mmol/L    Glucose 104 (H) 65 - 100 mg/dL    BUN 9 6 - 20 MG/DL    Creatinine 0.84 0.70 - 1.30 MG/DL    BUN/Creatinine ratio 11 (L) 12 - 20      GFR est AA >60 >60 ml/min/1.73m2    GFR est non-AA >60 >60 ml/min/1.73m2    Calcium 9.2 8.5 - 10.1 MG/DL    Bilirubin, total 0.5 0.2 - 1.0 MG/DL    ALT (SGPT) 22 12 - 78 U/L    AST (SGOT) 16 15 - 37 U/L    Alk. phosphatase 88 45 - 117 U/L    Protein, total 7.8 6.4 - 8.2 g/dL    Albumin 4.0 3.5 - 5.0 g/dL    Globulin 3.8 2.0 - 4.0 g/dL    A-G Ratio 1.1 1.1 - 2.2     LIPASE   Result Value Ref Range    Lipase 79 73 - 393 U/L   URINALYSIS W/ RFLX MICROSCOPIC   Result Value Ref Range    Color YELLOW/STRAW      Appearance TURBID (A) CLEAR      Specific gravity 1.025 1.003 - 1.030      pH (UA) 7.0 5.0 - 8.0      Protein Negative NEG mg/dL    Glucose Negative NEG mg/dL    Ketone 15 (A) NEG mg/dL    Blood Negative NEG      Urobilinogen 1.0 0.2 - 1.0 EU/dL    Nitrites Negative NEG      Leukocyte Esterase Negative NEG     MAGNESIUM   Result Value Ref Range    Magnesium 2.2 1.6 - 2.4 mg/dL   SALICYLATE   Result Value Ref Range    Salicylate level 2.4 (L) 2.8 - 20.0 MG/DL   ACETAMINOPHEN   Result Value Ref Range    Acetaminophen level <2 (L) 10 - 30 ug/mL   DRUG SCREEN, URINE   Result Value Ref Range    AMPHETAMINES Negative NEG      BARBITURATES Negative NEG      BENZODIAZEPINES Negative NEG      COCAINE Positive (A) NEG      METHADONE Negative NEG      OPIATES Negative NEG      PCP(PHENCYCLIDINE) Negative NEG      THC (TH-CANNABINOL) Positive (A) NEG      Drug screen comment (NOTE)    ETHYL ALCOHOL   Result Value Ref Range    ALCOHOL(ETHYL),SERUM <10 <10 MG/DL   SAMPLES BEING HELD   Result Value Ref Range    SAMPLES BEING HELD 1BLU,1SST,1PNK     COMMENT        Add-on orders for these samples will be processed based on acceptable specimen integrity and analyte stability, which may vary by analyte.    PROTHROMBIN TIME + INR   Result Value Ref Range    INR 1.0 0.9 - 1.1      Prothrombin time 10.4 9.0 - 11.1 sec   PTT   Result Value Ref Range    aPTT 23.6 22.1 - 31.0 sec    aPTT, therapeutic range     58.0 - 77.0 SECS   BILIRUBIN, CONFIRM   Result Value Ref Range    Bilirubin UA, confirm Negative NEG     TSH 3RD GENERATION   Result Value Ref Range    TSH 2.06 0.36 - 3.74 uIU/mL   LIPID PANEL   Result Value Ref Range    Cholesterol, total 137 <200 MG/DL    Triglyceride 66 <150 MG/DL    HDL Cholesterol 34 MG/DL    LDL, calculated 89.8 0 - 100 MG/DL    VLDL, calculated 13.2 MG/DL    CHOL/HDL Ratio 4.0 0.0 - 5.0     HEMOGLOBIN A1C WITH EAG   Result Value Ref Range    Hemoglobin A1c 4.8 4.0 - 5.6 %    Est. average glucose 91 mg/dL   EKG, 12 LEAD, INITIAL   Result Value Ref Range    Ventricular Rate 132 BPM    Atrial Rate 132 BPM    P-R Interval 142 ms    QRS Duration 82 ms    Q-T Interval 294 ms    QTC Calculation (Bezet) 435 ms    Calculated P Axis 63 degrees    Calculated R Axis 107 degrees    Calculated T Axis 1 degrees    Diagnosis       Sinus tachycardia      Confirmed by Bambi Love M.D., Anabelle Gusman ([id number]) on 11/19/2021 7:29:54 AM         Immunizations administered during this encounter: There is no immunization history on file for this patient. Screening for Metabolic Disorders for Patients on Antipsychotic Medications  (Data obtained from the EMR)    Estimated Body Mass Index  Estimated body mass index is 27.17 kg/m² as calculated from the following:    Height as of this encounter: 4' 10\" (1.473 m). Weight as of this encounter: 59 kg (130 lb).      Vital Signs/Blood Pressure  Visit Vitals  /60   Pulse 84   Temp 98.4 °F (36.9 °C)   Resp 18   Ht 4' 10\" (1.473 m)   Wt 59 kg (130 lb)   SpO2 100%   BMI 27.17 kg/m²       Blood Glucose/Hemoglobin A1c  Lab Results   Component Value Date/Time    Glucose 104 (H) 11/18/2021 10:36 PM       Lab Results   Component Value Date/Time    Hemoglobin A1c 4.8 11/23/2021 05:32 AM Lipid Panel  Lab Results   Component Value Date/Time    Cholesterol, total 137 2021 05:32 AM    HDL Cholesterol 34 2021 05:32 AM    LDL, calculated 89.8 2021 05:32 AM    Triglyceride 66 2021 05:32 AM    CHOL/HDL Ratio 4.0 2021 05:32 AM        Discharge Diagnosis: Please refer to physician's discharge summary. Discharge Plan: DISCHARGE SUMMARY    NAME:Cristina Lopez  : 1995  MRN: [id number]    The patient Hui Ratliff exhibits the ability to control behavior in a less restrictive environment. Patient's level of functioning is improving. No assaultive/destructive behavior has been observed for the past 24 hours. No suicidal/homicidal threat or behavior has been observed for the past 24 hours. There is no evidence of serious medication side effects. Patient has not been in physical or protective restraints for at least the past 24 hours. If weapons involved, how are they secured? None reported    Is patient aware of and in agreement with discharge plan? Yes    Arrangements for medication:  Prescriptions given to patient, given a weeks supply or 30 day supply. Arrangements for transportation home:  Lexi Heck to transport     Keep all follow up appointments as scheduled, continue to take prescribed medications per physician instructions.   Mental health crisis number:  [phone] or your local mental health crisis line number at [address] [facility] [address] WARM LINE      1-946-033-MHAV ([phone])      M-F: 9am to 9pm      Sat & Sun: 5pm  9pm  National suicide prevention lines:                             2-630-NVFDDOG ([phone])       6-319-094-TALK ([phone])    Crisis Text Line:  Text HOME to [phone]    Discharge Medication List and Instructions:   Current Discharge Medication List      START taking these medications    Details   risperiDONE (RisperDAL) 1 mg tablet Jose anthony tableta al cb  Indications: depression with psychosis  Qty: 30 Tablet, Refills: 5  Start date: 11/23/2021      zolpidem (AMBIEN) 5 mg tablet Jose anthony tableta según sea necesario por la noche para el insomnio  Indications: difficulty falling asleep  Qty: 30 Tablet, Refills: 0  Start date: 11/23/2021    Associated Diagnoses: Current severe episode of major depressive disorder with psychotic features without prior episode (HCC)      FLUoxetine (PROzac) 40 mg capsule Jose anthony capsula al cb  Indications: major depressive disorder  Qty: 30 Capsule, Refills: 5  Start date: 11/23/2021             Unresulted Labs (24h ago, onward)            None        To obtain results of studies pending at discharge, please contact N/A    Follow-up Information     Follow up With Specialties Details Why Contact [facility]  Call This organization offers primary care assistance for uninsured individuals then offers services such as counseling for those meeting criteria. HOTLINE: [phone]  *Please call hotline number to plan for hospital discharge appointment into the clinic. *  [place] [facility] enter at the back of the building. Phone: [phone]      [facility]   Please call to find a free clinic in your area of [place]  [phone]          Advanced Directive:   Does the patient have an appointed surrogate decision maker? Unknown   Does the patient have a Medical Advance Directive? Unknown   Does the patient have a Psychiatric Advance Directive? Unknown   If the patient does not have a surrogate or Medical Advance Directive AND Psychiatric Advance Directive, the patient was offered information on these advance directives. Unknown     Patient Instructions: Please continue all medications until otherwise directed by physician. Tobacco Cessation Discharge Plan:   Is the patient a smoker and needs referral for smoking cessation?  No  Patient referred to the following for smoking cessation with an appointment? No   Patient was offered medication to assist with smoking cessation at discharge? No  Was education for smoking cessation added to the discharge instructions? No     Alcohol/Substance Abuse Discharge Plan:   Does the patient have a history of substance/alcohol abuse and requires a referral for treatment? Yes  Patient referred to the following for substance/alcohol abuse treatment with an appointment? Yes  Patient was offered medication to assist with alcohol cessation at discharge? No  Was education for substance/alcohol abuse added to discharge instructions? Yes     Patient discharged to Home; provided to the patient/caregiver either in hard copy or electronically. Continuing care paperwork was faxed to community mental health providers.

## 2021-11-23 NOTE — BH NOTES
Psychiatric Progress Note    Patient: Bhavya Hodges MRN: [id number]  SSN: [id number]    YOB: 1995  Age: 32 y.o. Sex: male      Admit Date: 11/18/2021    LOS: 3 days     Subjective:     Bhavya Hodges  reports feeling down and moods are depressed. Denies SI/HI/AH/VH. No aggression or violence. Appropriately interactive and aware. Tolerating medications well. Eating fairly and sleeping well.    11/21 - Bhavya Hodges reports hearing the voices loudly in his ears. Reports feeling ok otherwise and moods are good. Denies SI/HI/AH/VH. No aggression or violence. Appropriately interactive and aware. Tolerating medications well. Eating and sleeping fairly. 11/22 - patient has been visible, he endorses CAH of voices telling him to drown himself. He is able to make needs known. Patient slept 8.5 hours, he is medication compliant and got no PRNs for agitation. Patient endorses SI, ongoing.        Objective:     Vitals:    11/20/21 0822 11/20/21 2040 11/21/21 1930 11/22/21 0748   BP: 115/73 (!) 95/53 (!) 104/57 106/66   Pulse: 83 63 70 68   Resp: 18 16 18 18   Temp: 98.4 °F (36.9 °C) 97.8 °F (36.6 °C) 98.5 °F (36.9 °C) 98.5 °F (36.9 °C)   SpO2: 97% 99% 100% 98%   Weight:       Height:            Mental Status Exam:   Sensorium  oriented to time, place and person   Relations cooperative   Eye Contact appropriate   Appearance:  age appropriate   Speech:  non-pressured and soft   Thought Process: goal directed   Thought Content hallucinations   Suicidal ideations none   Mood:  depressed   Affect:  constricted   Memory   adequate   Concentration:  adequate   Insight:  limited   Judgment:  limited       MEDICATIONS:  Current Facility-Administered Medications   Medication Dose Route Frequency    risperiDONE (RisperDAL) tablet 1 mg  1 mg Oral DAILY    zolpidem (AMBIEN) tablet 5 mg  5 mg Oral QHS    nicotine (NICODERM CQ) 14 mg/24 hr patch 1 Patch  1 Patch TransDERmal DAILY    FLUoxetine (PROzac) capsule 40 mg  40 mg Oral DAILY    OLANZapine (ZyPREXA) tablet 5 mg  5 mg Oral Q6H PRN    haloperidol lactate (HALDOL) injection 5 mg  5 mg IntraMUSCular Q6H PRN    benztropine (COGENTIN) tablet 1 mg  1 mg Oral BID PRN    diphenhydrAMINE (BENADRYL) injection 50 mg  50 mg IntraMUSCular BID PRN    hydrOXYzine HCL (ATARAX) tablet 50 mg  50 mg Oral TID PRN    LORazepam (ATIVAN) injection 1 mg  1 mg IntraMUSCular Q4H PRN    traZODone (DESYREL) tablet 50 mg  50 mg Oral QHS PRN    acetaminophen (TYLENOL) tablet 650 mg  650 mg Oral Q4H PRN    magnesium hydroxide (MILK OF MAGNESIA) 400 mg/5 mL oral suspension 30 mL  30 mL Oral DAILY PRN      DISCUSSION:   the risks and benefits of the proposed medication    Lab/Data Review: All lab results for the last 24 hours reviewed. No results found for this or any previous visit (from the past 24 hour(s)). Assessment:     Principal Problem:    Current severe episode of major depressive disorder with psychotic features without [facility] episode ([facility] (11/19/2021)    Active Problems:    Cocaine use disorder ([facility].) (11/19/2021)        Plan:     Continue current care  Collateral information  - INCREASE Prozac to 40 mg PO QDAY for MDD  - START Risperdal 1 mg QDAY for psychosis  - START Ambien 5 mg QHS for insomnia  Disposition planning with social work    I certify that this patient's inpatient psychiatric hospital services furnished since the previous certification were, and continue to be, required for treatment that could reasonably be expected to improve the patient's condition, or for diagnostic study, and that the patient continues to need, on a daily basis, active treatment furnished directly by or requiring the supervision of inpatient psychiatric facility personnel.  In addition, the hospital records show that services furnished were intensive treatment services, admission or related services, or equivalent services.   Signed By: Bk Coleman MD     November 22, 2021

## 2021-11-23 NOTE — BH NOTES
GROUP THERAPY PROGRESS NOTE    Patient did not participate in Recreational Therapy Group.      Sherie Buerger, MSW

## 2021-11-23 NOTE — BH NOTES
GROUP THERAPY PROGRESS NOTE    Patient is participating in coping skills group. Group time: 45 minutes    Personal goal for participation: Learn coping skills to reduce negative emotions    Goal orientation: Personal    Group therapy participation: minimal    Therapeutic interventions reviewed and discussed: Group discussion on why being bored can be a problem and the consequences of boredom that patient have experienced. Patient discussed issues they have had with being bored and ways they have tried to combat boredom. This lead to discussion of coping skills for negative emotions and ways to feel better that each patient has tried or that they have heard of. Discussion of activities that help with boredom, challenges, potential solutions and plans. Impression of participation: Shanta Mosley was present in group but did not join discussion due to language difficulties. He did appear to be listening to group discussion and appeared content to be around others even if he was unable to participate.     Pamela Hernandez LPC LSATP CSAC

## 2021-11-23 NOTE — BH NOTES
GROUP THERAPY PROGRESS NOTE    Patient is participating in Coping Skills group. Group time: 45 minutes     Personal goal for participation: Learn coping skills to improve mental health, reduce stress and work through uncomfortable emotions    Goal orientation: Personal    Group therapy participation: passive    Therapeutic interventions reviewed and discussed: Group discussion was focused on ways patients are coping with mental health needs, stress and uncomfortable emotions. Group members discussed alternative positive coping skills using each letter of the alphabet, resulting in patients having a list of 26+ positive coping skills to access. Patients discussed the difference between positive and negative coping skills. Impression of participation: Pt with depressed mood, dull affect, calm and cooperative. Pt walked in and out of dayroom throughout group session. Pt unable to verbally participate in group session due to language barrier.     VANNESSA Wellington

## 2021-11-23 NOTE — DISCHARGE INSTRUCTIONS
DISCHARGE SUMMARY  If I feel I am at risk of hurting myself or others, I will call the crisis office and speak with a crisis worker who will assist me during my crisis. [facility]  [phone]  [address] [address] [phone]  [facility]  [phone]    Patient Education        Recuperación de la depresión: Instrucciones de cuidado  Recovering From Depression: Care Instructions  Instrucciones de cuidado     Tener un buen cuidado de usted mismo es importante a medida que se recupera de la depresión. Con el tiempo, a medida que el tratamiento funcione jayden síntomas desaparecerán. No lo abandone. Al contrario, concentre del real energía en recuperarse. Del Real estado de ánimo mejorará. Solo tomará un tiempo. Concéntrese en cosas que le pueden ayudar a sentirse mejor, stanley estar con amigos o familiares, comer benigno y descansar lo suficiente. Vernon tómese las cosas con tranquilidad. No andra muchas actividades demasiado pronto. Reta [place] a sentirse mejor poco a [place] de seguimiento es anthony parte clave de del real tratamiento y seguridad. Asegúrese de hacer y acudir a todas las citas, y llame a del real médico si está teniendo problemas. También es anthony buena idea saber los resultados de jayden exámenes y mantener anthony lista de los medicamentos que denita. ¿Cómo puede cuidarse en el hogar? Sea realista  · Si debe hacer anthony tarea que le llevará [place], [place] en varias etapas pequeñas que usted pueda manejar y andra solo lo que pueda. · Es posible que [place] posponer las decisiones importantes hasta que se haya recuperado de la depresión. Si tiene planes que tendrán un gran impacto en del real viki, stanley casarse, divorciarse o cambiar de [place], intente esperar un poco. Háblelo con jayden amigos y seres queridos, quienes pueden ayudarle a analizar el panorama completo.   · Es importante acercarse a las personas para pedirles ayuda. No se aísle. Deje que del real ross y [employer]. Encuentre a alguien en quien pueda confiar y hable con danna persona. · Sea paciente y bondadoso consigo mismo. Recuerde que la depresión no es del real culpa y no es algo que usted pueda superar solo con fuerza de voluntad. El tratamiento es importante para la depresión, al igual que para cualquier otra enfermedad. Sentirse mejor lleva tiempo, y del real estado de ánimo mejorará poco a poco.  Manténgase activo  · Manténgase ocupado y [address]. Salga a caminar o intente con algún otro ejercicio suave. · Consulte a del real médico acerca de algún programa de ejercicios. El ejercicio le puede ayudar a aliviar la depresión leve. · Vaya al cine o a un concierto. Participe en alguna actividad de la montez o [place] reunión social. [place] a oscar a un partido de fútbol. · Pídale a un amigo que cene con usted. Cuídese  · Siga anthony dieta equilibrada con muchas frutas y verduras frescas, granos integrales y parrish magras de proteínas. Si perdió el apetito, coma pequeños refrigerios en lugar de comidas abundantes. · Evite el consumo de drogas ilegales o marihuana y no rocael alcohol. No tome medicamentos que no le hayan sido recetados a usted. Estos podrían interferir con los medicamentos que pudiera estar tomando para la depresión, o podrían empeorar la depresión. · [employer] medicamentos yessy stanley le fueron recetados. Usted podría empezar a sentirse mejor entre 1 y 3 semanas de estar tomando los antidepresivos. Vernon puede necesitar hasta 6 u 8 semanas para oscar más mejoría. Hable con del real médico si tiene preguntas o inquietudes acerca de jayden medicamentos, o si no observa ninguna mejoría en 3 semanas. · Siga tomando del real medicamento después de que jayden síntomas mejoren. Jose del real medicamento edgar al menos 6 meses después de sentirse mejor puede ayudarle a evitar deprimirse de nuevo.  Si no es la primera vez que ha estado deprimido, el médico podría recomendarle que tome del real medicamento edgar incluso más tiempo. · Informe a del real médico si tiene efectos secundarios causados por del real medicamento. Muchos efectos secundarios son leves y desaparecerán por sí solos después de que haya tomado el medicamento edgar algunas semanas. Algunos pueden durar [employer]. Hable con del real médico si los efectos secundarios son demasiado molestos. Martell vez pueda probar un medicamento diferente. · Continúe con el asesoramiento. Puede ayudar a evitar que vuelva a tener depresión, especialmente si ha tenido múltiples episodios de depresión. Hable con del real consejero si tiene dificultad para asistir a jayden sesiones o si gail que las sesiones no le están funcionando. No deje de acudir. · Duerma lo suficiente. Hable con del real médico si tiene problemas para dormir. · Evite las pastillas para dormir a menos que hayan sido recetadas por el médico que está tratando del real depresión. Las pastillas para dormir podrían hacerlo sentir aturdido edgar el día e interactuar con otros medicamentos que tome. · Si tiene alguna otra enfermedad, stanley diabetes, enfermedad del corazón o presión arterial collin, asegúrese de continuar con del real tratamiento. Hable con del real médico acerca de todos los medicamentos que denita, incluyendo aquellos con o sin receta. · Si usted o alguien a quien conoce habla acerca de suicidarse, autolesionarse o sentirse desesperado, busque ayuda de inmediato. Llame a la Línea nacional para la prevención del suicidio al [phone] ([phone]) o envíe un mensaje de texto [facility] al [id number] para acceder a la Línea de mensajes de texto en casos de crisis. Considere guardar estos números en del real teléfono. ¿Cuándo debe pedir ayuda? Llame al 911 en cualquier momento que considere que necesita atención de [place].  Por ejemplo, llame si:    · Siente ganas de lastimarse a sí mismo o a otra persona.     · Alguien que conoce está deprimido y está intentando suicidarse o está a punto de hacerlo. Llame a del real médico ahora mismo o busque atención médica inmediata si:    · Oye voces.     · Alguien que usted conoce está deprimido y:  ? Les Baba a regalar jayden posesiones. ? Usa drogas ilegales o john alcohol en exceso. ? Habla o escribe acerca de la muerte, lo que incluye notas de suicidio o [address] si[address]o de flori, cuchillos o pastillas. ? Les Baba a pasar mucho tiempo a solas. ? Actúa de manera muy agresiva o parece calmado de repente. Preste especial atención a los cambios en del real rosa y asegúrese de comunicarse con del real médico si:    · No mejora stanley se esperaba. ¿Dónde puede encontrar más información en inglés? Vaya a [email or web address]  Swapnil [id number] en la búsqueda para aprender más acerca de \"Recuperación de la depresión: Instrucciones de cuidado. \"  Revisado: 2021               Versión del contenido: 13.0  © 8856-1864 Healthwise, Incorporated. Las instrucciones de cuidado fueron adaptadas bajo licencia por [employer] (which disclaims liability or warranty for this information). Si usted tiene [place] afección médica o sobre estas instrucciones, siempre pregunte a del real profesional de rosa. Healthwise, Incorporated niega toda garantía o responsabilidad por del real uso de esta información. NAME:Cristina Ceja Denise  : 1995  MRN: [id number]    The patient Shade Jameson exhibits the ability to control behavior in a less restrictive environment. Patient's level of functioning is improving. No assaultive/destructive behavior has been observed for the past 24 hours. No suicidal/homicidal threat or behavior has been observed for the past 24 hours. There is no evidence of serious medication side effects. Patient has not been in physical or protective restraints for at least the past 24 hours. If weapons involved, how are they secured?  None reported    Is patient aware of and in agreement with discharge plan? Yes    Arrangements for medication:  Prescriptions given to patient, given a weeks supply or 30 day supply. Arrangements for transportation home:  Lisseth Pond to transport     Keep all follow up appointments as scheduled, continue to take prescribed medications per physician instructions.   Mental health crisis number:  842 or your local mental health crisis line number at [address] [facility] [address] Emergency WARM LINE      [phone] ([phone])      M-F: 9am to 9pm      Sat & Sun: 5pm - 9pm  National suicide prevention lines:                             0-703-TQARDSF ([phone])       7-911-058-TALK ([phone])   24/7 Crisis Text Line:  Text HOME to 775631

## 2021-11-24 VITALS
OXYGEN SATURATION: 98 % | HEIGHT: 60 IN | BODY MASS INDEX: 25.52 KG/M2 | HEART RATE: 95 BPM | DIASTOLIC BLOOD PRESSURE: 69 MMHG | SYSTOLIC BLOOD PRESSURE: 115 MMHG | TEMPERATURE: 97.7 F | RESPIRATION RATE: 18 BRPM | WEIGHT: 130 LBS

## 2021-11-24 PROCEDURE — 74011250637 HC RX REV CODE- 250/637: Performed by: PSYCHIATRY & NEUROLOGY

## 2021-11-24 PROCEDURE — 99239 HOSP IP/OBS DSCHRG MGMT >30: CPT | Performed by: PSYCHIATRY & NEUROLOGY

## 2021-11-24 RX ADMIN — RISPERIDONE 1 MG: 1 TABLET ORAL at 09:40

## 2021-11-24 RX ADMIN — FLUOXETINE 40 MG: 20 CAPSULE ORAL at 09:40

## 2021-11-24 NOTE — BH NOTES
Behavioral Health Treatment Team Note       Patient goal(s) for today: continue taking medication as prescribed, engage in unit activities, participate in hygiene/ADLs, follow directions from staff, contact support team, prepare for discharge  Treatment team focus/goals: medication adjustments, dispo planning  Progress note: Pt with anxious mood, constricted affect, pleasant and cooperative. Pt reports stable moods and sleep. Pt stated his boss requested he stay in the hospital one more day to stabilize. Pt to discharge today at 6 PM, coworkers to pick patient up from hospital before going to [place] for a work project. LOS:  5                       Expected LOS: TBD     Financial concerns/prescription coverage: Referred to [employer]   Date of last family contact: None                                  Family requesting physician contact today:No  Discharge plan: Home to hotel  Guns in the home: None reported                                                        Outpatient provider(s):  To be linked.      Participating treatment team members: Raad De La Vega, VANNESSA Rader and Dr. Ginny Hernandes

## 2021-11-24 NOTE — PROGRESS NOTES
Patient assessed in hallway during transition of care. Patient rounds completed throughout the shift to ensure safety. Patient presented as calm and cooperative. Patient denied any symptoms of depression/anxiety. Patient denied hallucinations of any type and did not endorse S/I or H/I. Patient reported overall goals of being with his family and loved ones. Patient's POC for pick-up did not arrive tonight. Patient took prescribed HS medication and went to bed around 9:00PM.  Patient slept throughout the night. No additional concerns noted. Staff will continue to support and monitor Patient.

## 2021-11-24 NOTE — BH NOTES
Psychiatric Progress Note    Patient: Hui Ratliff MRN: [id number]  SSN: [id number]    YOB: 1995  Age: 32 y.o. Sex: male      Admit Date: 11/18/2021    LOS: 5 days     Subjective:     Hui Ratliff  reports feeling down and moods are depressed. Denies SI/HI/AH/VH. No aggression or violence. Appropriately interactive and aware. Tolerating medications well. Eating fairly and sleeping well.    11/21 - Hui Ratliff reports hearing the voices loudly in his ears. Reports feeling ok otherwise and moods are good. Denies SI/HI/AH/VH. No aggression or violence. Appropriately interactive and aware. Tolerating medications well. Eating and sleeping fairly. 11/22 - patient has been visible, he endorses CAH of voices telling him to drown himself. He is able to make needs known. Patient slept 8.5 hours, he is medication compliant and got no PRNs for agitation. Patient endorses SI, ongoing. 11/23 - the patient has been visible, cooperative, medication compliant, with no episodes of agitation or instances of active self harm ideation. He is discharge focused, able to make needs known and with a plan to return to his coworkers and travel with them to [place] if they get a new contract. Patient denies SI/HI/AVH/PI.       Objective:     Vitals:    11/22/21 0748 11/22/21 2203 11/23/21 0900 11/24/21 0005   BP: 106/66 110/78 111/60 113/62   Pulse: 68 71 84 78   Resp: 18 17 18 17   Temp: 98.5 °F (36.9 °C) 98.1 °F (36.7 °C) 98.4 °F (36.9 °C) 98 °F (36.7 °C)   SpO2: 98%  100% 99%   Weight:       Height:            Mental Status Exam:   Sensorium  oriented to time, place and person   Relations cooperative   Eye Contact appropriate   Appearance:  age appropriate   Speech:  non-pressured and soft   Thought Process: goal directed   Thought Content hallucinations   Suicidal ideations none   Mood:  depressed   Affect:  constricted   Memory   adequate   Concentration: adequate   Insight:  limited   Judgment:  limited       MEDICATIONS:  Current Facility-Administered Medications   Medication Dose Route Frequency    risperiDONE (RisperDAL) tablet 1 mg  1 mg Oral DAILY    zolpidem (AMBIEN) tablet 5 mg  5 mg Oral QHS    nicotine (NICODERM CQ) 21 mg/24 hr patch 1 Patch  1 Patch TransDERmal DAILY    FLUoxetine (PROzac) capsule 40 mg  40 mg Oral DAILY    OLANZapine (ZyPREXA) tablet 5 mg  5 mg Oral Q6H PRN    haloperidol lactate (HALDOL) injection 5 mg  5 mg IntraMUSCular Q6H PRN    benztropine (COGENTIN) tablet 1 mg  1 mg Oral BID PRN    diphenhydrAMINE (BENADRYL) injection 50 mg  50 mg IntraMUSCular BID PRN    hydrOXYzine HCL (ATARAX) tablet 50 mg  50 mg Oral TID PRN    LORazepam (ATIVAN) injection 1 mg  1 mg IntraMUSCular Q4H PRN    traZODone (DESYREL) tablet 50 mg  50 mg Oral QHS PRN    acetaminophen (TYLENOL) tablet 650 mg  650 mg Oral Q4H PRN    magnesium hydroxide (MILK OF MAGNESIA) 400 mg/5 mL oral suspension 30 mL  30 mL Oral DAILY PRN      DISCUSSION:   the risks and benefits of the proposed medication    Lab/Data Review: All lab results for the last 24 hours reviewed. No results found for this or any previous visit (from the past 24 hour(s)).       Assessment:     Principal Problem:    Current severe episode of major depressive disorder with psychotic features [facility] (11/19/2021)    Active Problems:    Cocaine use disorder ([facility] (11/19/2021)        Plan:     Continue current care  Collateral information  - CONTINUE Prozac 40 mg PO QDAY for MDD  - CONTINUE Risperdal 1 mg QDAY for psychosis  - CONTINUE Ambien 5 mg QHS for insomnia  Disposition planning with social work    I certify that this patient's inpatient psychiatric hospital services furnished since the previous certification were, and continue to be, required for treatment that could reasonably be expected to improve the patient's condition, or for diagnostic study, and that the patient continues to need, on a daily basis, active treatment furnished directly by or requiring the supervision of inpatient psychiatric facility personnel. In addition, the hospital records show that services furnished were intensive treatment services, admission or related services, or equivalent services.   Signed By: Sandra Rose MD     November 24, 2021

## 2021-11-24 NOTE — BH NOTES
Patient was out and visible on unit, interacting appropriately with peers and staff. Affect is smiling, mood is euthymic. Pt cooperative with vitals and assessment. A&O x 4. Independent in ADLs. Insight and concentration present. Gait is steady. Appetite patterns WNL. Denies AVH/SI/HI/Anxiety/Depression. Patient reports feeling \"better and happy\". Patient was asked if he feels ready for discharge and what were some things he would like to do. Patient responded by saying, \"I want to ask god for forgiveness for my past actions. In addition, I'm going to work hard to motivate myself to do better for my family. \" Pt denies pain. Patient medication and diet compliant. Patient in day room on telephone upon assessment. No further issues noted at this time. Pt encouraged to continue to participate in care.

## 2021-11-24 NOTE — PROGRESS NOTES
Mr. Annalise Haddad actively participated in Spirituality Group about Healthy Sleep on [address] [facility]. [address] [place].   Spiritual Care Provider   Paging Service [phone]

## 2021-11-24 NOTE — BH NOTES
0700: Shift change report given to Charley KENT (oncoming nurse) by Rose Mary Greenfield (offgoing nurse). Report included the following information SBAR, Kardex, MAR and Recent Results. [id number]: Assumed care of patient. Met patient in room.  used to communicate with patient. Patient denied anxiety, depression, SI, HI, AVH and pain. Patient explained to staff about not being discharged yesterday. MD notified. Patient medication and meal compliant. [phone]: Patient awake in bed resting quietly. Then in dayroom for groups. [id number]: Patient meal compliant. Patient in hallway talking to  about discharge plans. [phone]: Patient in dayroom staring out the window. 5688-6886: Patient discharged.

## 2021-11-24 NOTE — BH NOTES
Patient alert. Patient discharged to continue recommended plan of care. Discharge instructions given to patient. Patient belongings returned. Patient transported via coworkers.

## 2021-11-24 NOTE — DISCHARGE SUMMARY
PSYCHIATRIC DISCHARGE SUMMARY         IDENTIFICATION:    Patient Name  Jason Casas   Date of Birth 1995   [facility] [id number]   Medical Record Number  [id number]      Age  32 y.o. PCP None   Admit date:  11/18/2021    Discharge date: 11/24/2021   Room Number  324/01  @ [facility]   Date of Service  11/24/2021            TYPE OF DISCHARGE: REGULAR               CONDITION AT DISCHARGE: improved and fair       PROVISIONAL & DISCHARGE DIAGNOSES:    Problem List  Never Reviewed          Codes Class    * (Principal) Current severe episode of major depressive disorder with psychotic features without prior episode ([facility] [address] ICD-10-CM: F32.3  ICD-9-CM: 296.24         Cocaine use disorder ([facility] [address] ICD-10-CM: F14.10  ICD-9-CM: 305.60               Active Hospital Problems    *Current severe episode of major depressive disorder with psychotic features without prior episode ([facility] [address]      Cocaine use disorder ([facility] [address]        DISCHARGE DIAGNOSIS:   Axis I:  SEE ABOVE  Axis II: SEE ABOVE  Axis III: SEE ABOVE  Axis IV:  lack of structure  Axis V:  30 on admission, 80 on discharge     CC & HISTORY OF PRESENT ILLNESS:  \"suicidal ideation\"    The patient, Jason Casas, is a 32 y.o. / male with a past psychiatric history significant for MDD and cocaine and cannabis use disorder, who presents at this time with complaints of (and/or evidence of) the following emotional symptoms: depression, suicidal thoughts/threats and suicide attempt via drinking poison. The above symptoms have been present for 2+ weeks. These symptoms are of moderate to high severity. These symptoms are constant in nature. The patient's condition has been precipitated by psychosocial stressors. Patient's condition made worse by continued illicit drug use.  UDS: +cocaine, THC; BAL=0.      Per admission documentation, the patient has been having several personal stressors, including a recent separation from his wife and children. 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 The patient denies any prior suicide attempts and states that he has never had any psychiatric problems prior to this. He endorsed AH of a voice telling him to harm himself after having smoked crack cocaine for the second time.     The patient is a fair historian and [place]ien speaking. The patient corroborates the above narrative. The patient contracts for safety on the unit and gives consent for the team to contact collateral. The patient is amenable to initiating treatment while on the unit. SOCIAL HISTORY:    Social History     Socioeconomic History    Marital status: SINGLE     Spouse name: Not on file    Number of children: Not on file    Years of education: Not on file    Highest education level: Not on file   Occupational History    Not on file   Tobacco Use    Smoking status: Never Smoker    Smokeless tobacco: Never Used   Substance and Sexual Activity    Alcohol use: Yes    Drug use: Yes     Types: Marijuana, Cocaine     Comment: last use 11/18/21    Sexual activity: Not on file   Other Topics Concern    Not on file   Social History Narrative    Not on file     Social Determinants of Health     Financial Resource Strain:     Difficulty of Paying Living Expenses: Not on file   Food Insecurity:     Worried About Running Out of Food in the Last Year: Not on file    Kory of Food in the Last Year: Not on file   Transportation Needs:     Lack of Transportation (Medical): Not on file    Lack of Transportation (Non-Medical):  Not on file   Physical Activity:     Days of Exercise per Week: Not on file    Minutes of Exercise per Session: Not on file   Stress:     Feeling of Stress : Not on file   Social Connections:     Frequency of Communication with Friends and Family: Not on file    Frequency of Social Gatherings with Friends and Family: Not on file    Attends Anabaptist Services: Not on file  Active Member of Clubs or Organizations: Not on file    Attends Club or Organization Meetings: Not on file    Marital Status: Not on file   Intimate Partner Violence:     Fear of Current or Ex-Partner: Not on file    Emotionally Abused: Not on file    Physically Abused: Not on file    Sexually Abused: Not on file   Housing Stability:     Unable to Pay for Housing in the Last Year: Not on file    Number of [place] in the Last Year: Not on file    Unstable Housing in the Last Year: Not on file      FAMILY HISTORY:   History reviewed. No pertinent family history. HOSPITALIZATION COURSE:    Leena Norris was admitted to the inpatient psychiatric unit [facility] for acute psychiatric stabilization in regards to symptomatology as described in the HPI above. The differential diagnosis at time of admission include: MDD vs adjustment disorder. While on the unit Leena Norris was involved in individual, group, occupational and milieu therapy. Psychiatric medications were adjusted during this hospitalization including Prozac and Risperdal.   Leena Norris demonstrated a slow, but progressive improvement in overall condition. Much of patient's initial presentation appeared to be related to situational stressors, effects of drugs of abuse, and psychological factors. Please see individual progress notes for more specific details regarding patient's hospitalization course. At time of discharge, Leena Norris is without significant problems of depression, psychosis, or meg. Patient free of suicidal and homicidal ideations (appears to be at very low risk of suicide or homicide) and reports many positive predictive factors in terms of not attempting suicide or homicide. Overall presentation at time of discharge is most consistent with the diagnosis of major depressive disorder and cocaine use disorder.     Patient has maximized benefit to be derived from acute inpatient psychiatric treatment. All members of the treatment team concur with each other in regards to plans for discharge today. Patient and family are aware and in agreement with discharge and discharge plan. LABS AND IMAGAING:    Labs Reviewed   CBC WITH AUTOMATED DIFF - Abnormal; Notable for the following components:       Result Value    NEUTROPHILS 82 (*)     LYMPHOCYTES 11 (*)     IMMATURE GRANULOCYTES 1 (*)     ABS. NEUTROPHILS 8.6 (*)     ABS. IMM.  GRANS. 0.1 (*)     All other components within normal limits   METABOLIC PANEL, COMPREHENSIVE - Abnormal; Notable for the following components:    Potassium 3.1 (*)     Glucose 104 (*)     BUN/Creatinine ratio 11 (*)     All other components within normal limits   URINALYSIS W/ RFLX MICROSCOPIC - Abnormal; Notable for the following components:    Appearance TURBID (*)     Ketone 15 (*)     All other components within normal limits   SALICYLATE - Abnormal; Notable for the following components:    Salicylate level 2.4 (*)     All other components within normal limits   ACETAMINOPHEN - Abnormal; Notable for the following components:    Acetaminophen level <2 (*)     All other components within normal limits   DRUG SCREEN, URINE - Abnormal; Notable for the following components:    COCAINE Positive (*)     THC (TH-CANNABINOL) Positive (*)     All other components within normal limits   COVID-19 WITH INFLUENZA A/B   LIPASE   MAGNESIUM   ETHYL ALCOHOL   SAMPLES BEING HELD   PROTHROMBIN TIME + INR   PTT   BILIRUBIN, CONFIRM   TSH 3RD GENERATION   LIPID PANEL   HEMOGLOBIN A1C WITH EAG     No results found for: DS35, PHEN, PHENO, PHENT, DILF, DS39, PHENY, PTN, VALF2, VALAC, VALP, VALPR, DS6, CRBAM, CRBAMP, CARB2, XCRBAM  Admission on 11/18/2021, Discharged on 11/24/2021   Component Date Value Ref Range Status    WBC 11/18/2021 10.4  4.1 - 11.1 K/uL Final    RBC 11/18/2021 5.44  4.10 - 5.70 M/uL Final    HGB 11/18/2021 16.6  12.1 - 17.0 g/dL Final    HCT 11/18/2021 48.3  36.6 - 50.3 % Final    MCV 11/18/2021 88.8  80.0 - 99.0 FL Final    MCH 11/18/2021 30.5  26.0 - 34.0 PG Final    MCHC 11/18/2021 34.4  30.0 - 36.5 g/dL Final    RDW 11/18/2021 13.5  11.5 - 14.5 % Final    PLATELET 30/49/0114 188  150 - 400 K/uL Final    MPV 11/18/2021 11.6  8.9 - 12.9 FL Final    NRBC 11/18/2021 0.0  0  WBC Final    ABSOLUTE NRBC 11/18/2021 0.00  0.00 - 0.01 K/uL Final    NEUTROPHILS 11/18/2021 82* 32 - 75 % Final    LYMPHOCYTES 11/18/2021 11* 12 - 49 % Final    MONOCYTES 11/18/2021 5  5 - 13 % Final    EOSINOPHILS 11/18/2021 0  0 - 7 % Final    BASOPHILS 11/18/2021 1  0 - 1 % Final    IMMATURE GRANULOCYTES 11/18/2021 1* 0.0 - 0.5 % Final    ABS. NEUTROPHILS 11/18/2021 8.6* 1.8 - 8.0 K/UL Final    ABS. LYMPHOCYTES 11/18/2021 1.1  0.8 - 3.5 K/UL Final    ABS. MONOCYTES 11/18/2021 0.5  0.0 - 1.0 K/UL Final    ABS. EOSINOPHILS 11/18/2021 0.0  0.0 - 0.4 K/UL Final    ABS. BASOPHILS 11/18/2021 0.1  0.0 - 0.1 K/UL Final    ABS. IMM. GRANS. 11/18/2021 0.1* 0.00 - 0.04 K/UL Final    DF 11/18/2021 AUTOMATED    Final    Sodium 11/18/2021 140  136 - 145 mmol/L Final    Potassium 11/18/2021 3.1* 3.5 - 5.1 mmol/L Final    Chloride 11/18/2021 104  97 - 108 mmol/L Final    CO2 11/18/2021 26  21 - 32 mmol/L Final    Anion gap 11/18/2021 10  5 - 15 mmol/L Final    Glucose 11/18/2021 104* 65 - 100 mg/dL Final    BUN 11/18/2021 9  6 - 20 MG/DL Final    Creatinine 11/18/2021 0.84  0.70 - 1.30 MG/DL Final    BUN/Creatinine ratio 11/18/2021 11* 12 - 20   Final    GFR est AA 11/18/2021 >60  >60 ml/min/1.73m2 Final    GFR est non-AA 11/18/2021 >60  >60 ml/min/1.73m2 Final    Calcium 11/18/2021 9.2  8.5 - 10.1 MG/DL Final    Bilirubin, total 11/18/2021 0.5  0.2 - 1.0 MG/DL Final    ALT (SGPT) 11/18/2021 22  12 - 78 U/L Final    AST (SGOT) 11/18/2021 16  15 - 37 U/L Final    Alk.  phosphatase 11/18/2021 88  45 - 117 U/L Final    Protein, total 11/18/2021 7.8  6.4 - 8.2 g/dL Final    Albumin 11/18/2021 4.0  3.5 - 5.0 g/dL Final    Globulin 11/18/2021 3.8  2.0 - 4.0 g/dL Final    A-G Ratio 11/18/2021 1.1  1.1 - 2.2   Final    Lipase 11/18/2021 79  73 - 393 U/L Final    Color 11/18/2021 YELLOW/STRAW    Final    Appearance 11/18/2021 TURBID* CLEAR   Final    Specific gravity 11/18/2021 1.025  1.003 - 1.030   Final    pH (UA) 11/18/2021 7.0  5.0 - 8.0   Final    Protein 11/18/2021 Negative  NEG mg/dL Final    Glucose 11/18/2021 Negative  NEG mg/dL Final    Ketone 11/18/2021 15* NEG mg/dL Final    Blood 11/18/2021 Negative  NEG   Final    Urobilinogen 11/18/2021 1.0  0.2 - 1.0 EU/dL Final    Nitrites 11/18/2021 Negative  NEG   Final    Leukocyte Esterase 11/18/2021 Negative  NEG   Final    Magnesium 11/18/2021 2.2  1.6 - 2.4 mg/dL Final    Salicylate level 51/42/0065 2.4* 2.8 - 20.0 MG/DL Final    Acetaminophen level 11/18/2021 <2* 10 - 30 ug/mL Final    AMPHETAMINES 11/18/2021 Negative  NEG   Final    BARBITURATES 11/18/2021 Negative  NEG   Final    BENZODIAZEPINES 11/18/2021 Negative  NEG   Final    COCAINE 11/18/2021 Positive* NEG   Final    METHADONE 11/18/2021 Negative  NEG   Final    OPIATES 11/18/2021 Negative  NEG   Final    PCP(PHENCYCLIDINE) 11/18/2021 Negative  NEG   Final    THC (TH-CANNABINOL) 11/18/2021 Positive* NEG   Final    Drug screen comment 11/18/2021 (NOTE)   Final    ALCOHOL(ETHYL),SERUM 11/18/2021 <10  <10 MG/DL Final    SARS-CoV-2 11/18/2021 Not detected  NOTD   Final    Influenza A by PCR 11/18/2021 Not detected    Final    Influenza B by PCR 11/18/2021 Not detected    Final    SAMPLES BEING HELD 11/18/2021 1BLU,1SST,1PNK   Final    COMMENT 11/18/2021 Add-on orders for these samples will be processed based on acceptable specimen integrity and analyte stability, which may vary by analyte.     Final    Ventricular Rate 11/18/2021 132  BPM Final    Atrial Rate 11/18/2021 132  BPM Final    P-R Interval 11/18/2021 142  ms Final    QRS Duration 11/18/2021 82  ms Final    Q-T Interval 11/18/2021 294  ms Final    QTC Calculation (Bezet) 11/18/2021 435  ms Final    Calculated P Axis 11/18/2021 63  degrees Final    Calculated R Axis 11/18/2021 107  degrees Final    Calculated T Axis 11/18/2021 1  degrees Final    Diagnosis 11/18/2021    Final                    Value:Sinus tachycardia      Confirmed by [facility] Shreya ROPER M.D., Daphne Acevedo ([id number]) on 11/19/2021 7:29:54 AM      INR 11/18/2021 1.0  0.9 - 1.1   Final    Prothrombin time 11/18/2021 10.4  9.0 - 11.1 sec Final    aPTT 11/18/2021 23.6  22.1 - 31.0 sec Final    aPTT, therapeutic range 11/18/2021      58.0 - 77.0 SECS Final    Bilirubin UA, confirm 11/18/2021 Negative  NEG   Final    TSH 11/23/2021 2.06  0.36 - 3.74 uIU/mL Final    Cholesterol, total 11/23/2021 137  <200 MG/DL Final    Triglyceride 11/23/2021 66  <150 MG/DL Final    HDL Cholesterol 11/23/2021 34  MG/DL Final    LDL, calculated 11/23/2021 89.8  0 - 100 MG/DL Final    VLDL, calculated 11/23/2021 13.2  MG/DL Final    CHOL/HDL Ratio 11/23/2021 4.0  0.0 - 5.0   Final    Hemoglobin A1c 11/23/2021 4.8  4.0 - 5.6 % Final    Est. average glucose 11/23/2021 91  mg/dL Final     No results found. DISPOSITION:    Home. Patient to f/u with psychiatric appointments. Patient is to f/u with internist as directed. FOLLOW-UP CARE:    Activity as tolerated  Regular diet  Wound Care: none needed. Follow-up Information     Follow up With Specialties Details Why [id number]  Call This organization offers primary care assistance for uninsured individuals then offers services such as counseling for those meeting criteria. HOTLINE: [phone]  *Please call hotline number to plan for hospital discharge appointment into the clinic. *  Across from [facility]; enter at the back of the building.   Phone: [phone]      [facility]   Please call to find a free clinic in your area of [address] [place] [address]. [phone]    None    None ([phone]) Patient stated that they have no PCP                   PROGNOSIS:   Fair ---- based on nature of patient's pathology/ies and treatment compliance issues. Prognosis is greatly dependent upon patient's ability to remain sober and to follow up with scheduled appointments as well as to comply with psychiatric medications as prescribed. DISCHARGE MEDICATIONS:    Informed consent given for the use of following psychotropic medications:  Discharge Medication List as of 11/23/2021  4:17 PM      START taking these medications    Details   risperiDONE (RisperDAL) 1 mg tablet Jose anthony tableta al cb  Indications: depression with psychosis, Print, Disp-30 Tablet, R-5      zolpidem (AMBIEN) 5 mg tablet Jose anthony tableta según sea necesario por la noche para el insomnio  Indications: difficulty falling asleep, Print, Disp-30 Tablet, R-0         CONTINUE these medications which have CHANGED    Details   FLUoxetine (PROzac) 40 mg capsule Jose anthony capsula al cb  Indications: major depressive disorder, Print, Disp-30 Capsule, R-5                    A coordinated, multidisplinary treatment team round was conducted with Lurdes Reid---this is done daily here at [facility]. This team consists of the nurse, psychiatric unit pharmacist,  and writer. I have spent greater than 35 minutes on discharge work.     Signed:  Daisha Mann MD  11/24/2021

## 2022-03-19 PROBLEM — F14.10 COCAINE USE DISORDER (HCC): Status: ACTIVE | Noted: 2021-11-19

## 2022-03-20 PROBLEM — F32.3 CURRENT SEVERE EPISODE OF MAJOR DEPRESSIVE DISORDER WITH PSYCHOTIC FEATURES WITHOUT PRIOR EPISODE (HCC): Status: ACTIVE | Noted: 2021-11-19

## 2023-05-15 RX ORDER — ZOLPIDEM TARTRATE 5 MG/1
TABLET ORAL
COMMUNITY
Start: 2021-11-23

## 2023-05-15 RX ORDER — FLUOXETINE HYDROCHLORIDE 40 MG/1
CAPSULE ORAL
COMMUNITY
Start: 2021-11-23

## 2023-05-15 RX ORDER — RISPERIDONE 1 MG/1
TABLET ORAL
COMMUNITY
Start: 2021-11-23